# Patient Record
Sex: FEMALE | Race: BLACK OR AFRICAN AMERICAN | NOT HISPANIC OR LATINO | Employment: FULL TIME | URBAN - METROPOLITAN AREA
[De-identification: names, ages, dates, MRNs, and addresses within clinical notes are randomized per-mention and may not be internally consistent; named-entity substitution may affect disease eponyms.]

---

## 2017-01-03 ENCOUNTER — LAB VISIT (OUTPATIENT)
Dept: LAB | Facility: HOSPITAL | Age: 47
End: 2017-01-03
Attending: REGISTERED NURSE
Payer: COMMERCIAL

## 2017-01-03 ENCOUNTER — OFFICE VISIT (OUTPATIENT)
Dept: FAMILY MEDICINE | Facility: CLINIC | Age: 47
End: 2017-01-03
Payer: COMMERCIAL

## 2017-01-03 VITALS
DIASTOLIC BLOOD PRESSURE: 90 MMHG | TEMPERATURE: 98 F | BODY MASS INDEX: 33.06 KG/M2 | RESPIRATION RATE: 18 BRPM | HEART RATE: 91 BPM | WEIGHT: 198.44 LBS | HEIGHT: 65 IN | OXYGEN SATURATION: 96 % | SYSTOLIC BLOOD PRESSURE: 152 MMHG

## 2017-01-03 DIAGNOSIS — Z12.39 BREAST CANCER SCREENING: ICD-10-CM

## 2017-01-03 DIAGNOSIS — Z00.00 ANNUAL PHYSICAL EXAM: Primary | ICD-10-CM

## 2017-01-03 DIAGNOSIS — I10 ESSENTIAL HYPERTENSION: ICD-10-CM

## 2017-01-03 DIAGNOSIS — E66.9 OBESITY, CLASS I, BMI 30-34.9: ICD-10-CM

## 2017-01-03 DIAGNOSIS — Z00.00 ANNUAL PHYSICAL EXAM: ICD-10-CM

## 2017-01-03 LAB
ALBUMIN SERPL BCP-MCNC: 3.7 G/DL
ALP SERPL-CCNC: 66 U/L
ALT SERPL W/O P-5'-P-CCNC: 20 U/L
ANION GAP SERPL CALC-SCNC: 13 MMOL/L
AST SERPL-CCNC: 22 U/L
BASOPHILS # BLD AUTO: 0.01 K/UL
BASOPHILS NFR BLD: 0.1 %
BILIRUB SERPL-MCNC: 0.5 MG/DL
BUN SERPL-MCNC: 16 MG/DL
CALCIUM SERPL-MCNC: 8.9 MG/DL
CHLORIDE SERPL-SCNC: 106 MMOL/L
CHOLEST/HDLC SERPL: 3.2 {RATIO}
CO2 SERPL-SCNC: 19 MMOL/L
CREAT SERPL-MCNC: 1.2 MG/DL
CREAT UR-MCNC: 93 MG/DL
DIFFERENTIAL METHOD: NORMAL
EOSINOPHIL # BLD AUTO: 0.1 K/UL
EOSINOPHIL NFR BLD: 1.3 %
ERYTHROCYTE [DISTWIDTH] IN BLOOD BY AUTOMATED COUNT: 13.6 %
EST. GFR  (AFRICAN AMERICAN): >60 ML/MIN/1.73 M^2
EST. GFR  (NON AFRICAN AMERICAN): 54.3 ML/MIN/1.73 M^2
GLUCOSE SERPL-MCNC: 132 MG/DL
HCT VFR BLD AUTO: 38.9 %
HDL/CHOLESTEROL RATIO: 31.3 %
HDLC SERPL-MCNC: 166 MG/DL
HDLC SERPL-MCNC: 52 MG/DL
HGB BLD-MCNC: 13.3 G/DL
LDLC SERPL CALC-MCNC: 97.4 MG/DL
LYMPHOCYTES # BLD AUTO: 1.6 K/UL
LYMPHOCYTES NFR BLD: 21.6 %
MCH RBC QN AUTO: 30.2 PG
MCHC RBC AUTO-ENTMCNC: 34.2 %
MCV RBC AUTO: 88 FL
MICROALBUMIN UR DL<=1MG/L-MCNC: 28 UG/ML
MICROALBUMIN/CREATININE RATIO: 30.1 UG/MG
MONOCYTES # BLD AUTO: 0.4 K/UL
MONOCYTES NFR BLD: 5.5 %
NEUTROPHILS # BLD AUTO: 5.4 K/UL
NEUTROPHILS NFR BLD: 71.4 %
NONHDLC SERPL-MCNC: 114 MG/DL
PLATELET # BLD AUTO: 240 K/UL
PMV BLD AUTO: 11.9 FL
POTASSIUM SERPL-SCNC: 4 MMOL/L
PROT SERPL-MCNC: 7.2 G/DL
RBC # BLD AUTO: 4.41 M/UL
SODIUM SERPL-SCNC: 138 MMOL/L
TRIGL SERPL-MCNC: 83 MG/DL
TSH SERPL DL<=0.005 MIU/L-ACNC: 1.08 UIU/ML
WBC # BLD AUTO: 7.59 K/UL

## 2017-01-03 PROCEDURE — 85025 COMPLETE CBC W/AUTO DIFF WBC: CPT

## 2017-01-03 PROCEDURE — 3077F SYST BP >= 140 MM HG: CPT | Mod: S$GLB,,, | Performed by: REGISTERED NURSE

## 2017-01-03 PROCEDURE — 99396 PREV VISIT EST AGE 40-64: CPT | Mod: S$GLB,,, | Performed by: REGISTERED NURSE

## 2017-01-03 PROCEDURE — 99999 PR PBB SHADOW E&M-EST. PATIENT-LVL III: CPT | Mod: PBBFAC,,, | Performed by: REGISTERED NURSE

## 2017-01-03 PROCEDURE — 36415 COLL VENOUS BLD VENIPUNCTURE: CPT | Mod: PO

## 2017-01-03 PROCEDURE — 80061 LIPID PANEL: CPT

## 2017-01-03 PROCEDURE — 84443 ASSAY THYROID STIM HORMONE: CPT

## 2017-01-03 PROCEDURE — 3080F DIAST BP >= 90 MM HG: CPT | Mod: S$GLB,,, | Performed by: REGISTERED NURSE

## 2017-01-03 PROCEDURE — 80053 COMPREHEN METABOLIC PANEL: CPT

## 2017-01-03 PROCEDURE — 82570 ASSAY OF URINE CREATININE: CPT

## 2017-01-03 RX ORDER — VALSARTAN 160 MG/1
160 TABLET ORAL DAILY
Qty: 90 TABLET | Refills: 3 | Status: SHIPPED | OUTPATIENT
Start: 2017-01-03 | End: 2017-03-03

## 2017-01-03 NOTE — PROGRESS NOTES
"Subjective:       Patient ID: Mildred Meier is a 46 y.o. female.    Chief Complaint: Annual Exam    HPI     Mildred is here today for her annual wellness exam.  I have reviewed the patient's medical history in detail and updated the computerized patient record.    She has not been taking her HTN medication (benazepril-HCT) x few months, reports it gave her headaches and was not tolerating.  However, she continues with HA due to blood pressure being elevated off medication.  She wishes to try Diovan as she heard this was a "good medication for BP".  Home BP running ~ 140-150/80-90.  Treating HA with apple cider vinegar, BC Powder and Aleve.      Current Outpatient Prescriptions on File Prior to Visit   Medication Sig Dispense Refill    multivitamin (ONE DAILY MULTIVITAMIN) per tablet Take 1 tablet by mouth once daily.      benazepril-hydrochlorthiazide (LOTENSIN HCT) 10-12.5 mg Tab    NOT TAKING Take 1 tablet by mouth once daily. 90 tablet 1         Review of Systems   Constitutional: Positive for unexpected weight change (> 11 lbs in 1 year). Negative for activity change, appetite change, chills, diaphoresis, fatigue and fever.   HENT: Negative for congestion, nosebleeds, postnasal drip, rhinorrhea, sinus pressure, sneezing, sore throat and tinnitus.    Eyes: Negative.    Respiratory: Negative.    Cardiovascular: Negative for chest pain, palpitations and leg swelling.   Gastrointestinal: Negative.    Endocrine: Negative for cold intolerance, heat intolerance, polydipsia, polyphagia and polyuria.   Genitourinary: Negative.    Musculoskeletal: Negative.    Skin: Negative.    Neurological: Positive for headaches. Negative for dizziness, tremors, syncope, facial asymmetry, speech difficulty, weakness, light-headedness and numbness.   Hematological: Negative for adenopathy. Does not bruise/bleed easily.   Psychiatric/Behavioral: Negative.        Objective:         Vitals:    01/03/17 0804   BP: (!) 152/90   BP " "Location: Left arm   Patient Position: Sitting   BP Method: Manual   Pulse: 91   Resp: 18   Temp: 97.8 °F (36.6 °C)   TempSrc: Tympanic   SpO2: 96%   Weight: 90 kg (198 lb 6.6 oz)   Height: 5' 5" (1.651 m)       Physical Exam   Constitutional: She is oriented to person, place, and time. She appears well-developed and well-nourished.   HENT:   Head: Normocephalic and atraumatic.   Right Ear: External ear normal.   Left Ear: External ear normal.   Nose: Nose normal.   Mouth/Throat: Oropharynx is clear and moist. No oropharyngeal exudate.   Eyes: Conjunctivae and EOM are normal. Pupils are equal, round, and reactive to light. Right eye exhibits no discharge. Left eye exhibits no discharge. No scleral icterus.   Neck: Normal range of motion. Neck supple. No JVD present. No tracheal deviation present. No thyromegaly present.   Cardiovascular: Normal rate, regular rhythm, normal heart sounds and intact distal pulses.  Exam reveals no gallop and no friction rub.    No murmur heard.  Pulmonary/Chest: Effort normal and breath sounds normal. No stridor. No respiratory distress. She has no wheezes. She exhibits no tenderness. Right breast exhibits no mass. Left breast exhibits no mass. Breasts are symmetrical. There is no breast swelling.   Abdominal: Soft. Bowel sounds are normal. She exhibits no distension and no mass. There is no tenderness.   Genitourinary: Rectum normal and vagina normal. Rectal exam shows no tenderness and guaiac negative stool. No breast tenderness or discharge. There is no rash, tenderness or lesion on the right labia. There is no rash, tenderness or lesion on the left labia. No erythema or tenderness in the vagina. No vaginal discharge found.   Genitourinary Comments: No masses palpated.   Musculoskeletal: Normal range of motion. She exhibits no edema or deformity.   Lymphadenopathy:     She has no cervical adenopathy.   Neurological: She is alert and oriented to person, place, and time. She has " normal reflexes. She displays normal reflexes. No cranial nerve deficit. She exhibits normal muscle tone. Coordination normal.   Skin: Skin is warm and dry. No rash noted. She is not diaphoretic. No erythema.   Psychiatric: She has a normal mood and affect. Her behavior is normal. Judgment and thought content normal.   Vitals reviewed.      Assessment:       1. Annual physical exam    2. Essential hypertension    3. Obesity, Class I, BMI 30-34.9    4. Breast cancer screening        Plan:       Mildred Portillo was seen today for annual exam.    Diagnoses and all orders for this visit:    Annual physical exam  -     Mammo Digital Screening Bilat with CAD; Future  -     CBC auto differential; Future  -     Comprehensive metabolic panel; Future  -     TSH; Future  -     Lipid panel; Future  -     Microalbumin/creatinine urine ratio    Essential hypertension  -     valsartan (DIOVAN) 160 MG tablet; Take 1 tablet (160 mg total) by mouth once daily.  -     CBC auto differential; Future  -     Comprehensive metabolic panel; Future  -     TSH; Future  -     Lipid panel; Future  -     Microalbumin/creatinine urine ratio    Obesity, Class I, BMI 30-34.9  -     CBC auto differential; Future  -     Comprehensive metabolic panel; Future  -     TSH; Future  -     Lipid panel; Future    Breast cancer screening  -     Mammo Digital Screening Bilat with CAD; Future      Lab results pending.  Declines flu shot today.  Follow-up in 1 week for nurse visit BP recheck.  RTC prn.

## 2017-01-03 NOTE — MR AVS SNAPSHOT
[facility]  [address] [address]  Phone: [phone]                  Mildred Meier   1/3/2017 8:00 AM   Office Visit    Description:  Female : 1970   Provider:  Bassam Coffey NP   Department:  [facility]           Reason for Visit     Annual Exam           Diagnoses this Visit        Comments    Annual physical exam    -  Primary     Essential hypertension         Obesity, Class I, BMI 30-34.9         Breast cancer screening                To Do List           Future Appointments        Provider Department Dept Phone    1/3/2017 11:10 AM LABORATORY, [address] [facility]  [phone]    2017 4:00 PM [facility] [id number] [facility] [phone]    1/10/2017 9:00 AM FAMILY MEDICINE NURSE, [place] [facility] [phone]      Goals (5 Years of Data)     None       These Medications        Disp Refills Start End    valsartan (DIOVAN) 160 MG tablet 90 tablet 3 1/3/2017 1/3/2018    Take 1 tablet (160 mg total) by mouth once daily. - Oral    Pharmacy: [facility] [place] [address] [address] AT [facility] Ph #: [phone]         [facility]     [facility] Care [facility] -  Assistance  Registered nurses in the [facility] provide clinical advisement, health education, appointment booking, and other advisory services.  Call for this free service at [phone].             Medications           Message regarding Medications     Verify the changes and/or additions to your medication regime listed below are the same as discussed with your clinician today.  If any of these changes or additions are incorrect, please notify your healthcare provider.        START taking these NEW medications        Refills    valsartan (DIOVAN) 160 MG tablet 3    Sig: Take 1 tablet (160 mg total) by mouth once daily.    Class:  "Normal    Route: Oral      STOP taking these medications     benazepril-hydrochlorthiazide (LOTENSIN HCT) 10-12.5 mg Tab Take 1 tablet by mouth once daily.           Verify that the below list of medications is an accurate representation of the medications you are currently taking.  If none reported, the list may be blank. If incorrect, please contact your healthcare provider. Carry this list with you in case of emergency.           Current Medications     multivitamin (ONE DAILY MULTIVITAMIN) per tablet Take 1 tablet by mouth once daily.    blood pressure monitor Kit 1 kit by Misc.(Non-Drug; Combo Route) route as directed.    valsartan (DIOVAN) 160 MG tablet Take 1 tablet (160 mg total) by mouth once daily.           Clinical Reference Information           Vital Signs - Last Recorded  Most recent update: 1/3/2017  8:06 AM by Nasreen Currie MA    BP Pulse Temp Resp Ht Wt    (!) 152/90 (BP Location: Left arm, Patient Position: Sitting, BP Method: Manual) 91 97.8 °F (36.6 °C) (Tympanic) 18 5' 5" (1.651 m) 90 kg (198 lb 6.6 oz)    SpO2 BMI             96% 33.02 kg/m2         Blood Pressure          Most Recent Value    BP  (!)  152/90      Allergies as of 1/3/2017     No Known Allergies      Immunizations Administered on Date of Encounter - 1/3/2017     None      Orders Placed During Today's Visit      Normal Orders This Visit    Microalbumin/creatinine urine ratio     Future Labs/Procedures Expected by Expires    CBC auto differential  1/3/2017 3/4/2018    Comprehensive metabolic panel  1/3/2017 3/4/2018    Lipid panel  1/3/2017 3/4/2018    Mammo Digital Screening Bilat with CAD  1/3/2017 3/5/2018    TSH  1/3/2017 3/4/2018      [facility] Sign-Up     Activating your [facility] account is as easy as 1-2-3!     1) Visit [email or web address], select Sign Up Now, enter this activation code and your date of birth, then select Next.  [email or web address]  Expires: 2/17/2017  8:21 AM      2) Create a username and password to use when " you visit [facility] in the future and select a security question in case you lose your password and select Next.    3) Enter your e-mail address and click Sign Up!    Additional Information  If you have questions, please e-mail [email or web address] or call [phone] to talk to our [facility] staff. Remember, [facility] is NOT to be used for urgent needs. For medical emergencies, dial 911.

## 2017-01-04 ENCOUNTER — TELEPHONE (OUTPATIENT)
Dept: FAMILY MEDICINE | Facility: CLINIC | Age: 47
End: 2017-01-04

## 2017-01-04 DIAGNOSIS — R73.01 ELEVATED FASTING GLUCOSE: Primary | ICD-10-CM

## 2017-01-04 NOTE — TELEPHONE ENCOUNTER
Labs reviewed ---- fasting sugar elevated, need to screen for diabetes.  All other labs normal.    A1c ordered.

## 2017-01-10 ENCOUNTER — HOSPITAL ENCOUNTER (OUTPATIENT)
Dept: RADIOLOGY | Facility: HOSPITAL | Age: 47
Discharge: HOME | End: 2017-01-10
Attending: REGISTERED NURSE
Payer: COMMERCIAL

## 2017-01-10 DIAGNOSIS — Z12.39 BREAST CANCER SCREENING: ICD-10-CM

## 2017-01-10 DIAGNOSIS — Z00.00 ANNUAL PHYSICAL EXAM: ICD-10-CM

## 2017-01-10 PROCEDURE — 77067 SCR MAMMO BI INCL CAD: CPT | Mod: TC

## 2017-01-10 PROCEDURE — 77067 SCR MAMMO BI INCL CAD: CPT | Mod: 26,,, | Performed by: RADIOLOGY

## 2017-02-20 ENCOUNTER — TELEPHONE (OUTPATIENT)
Dept: FAMILY MEDICINE | Facility: CLINIC | Age: 47
End: 2017-02-20

## 2017-02-20 NOTE — TELEPHONE ENCOUNTER
The glucose issue also needs to be addressed.  Have been trying to get in touch w/ her since January about this.

## 2017-02-20 NOTE — TELEPHONE ENCOUNTER
She doesn't take her BP. I advised her to take her BP before she takes her medication when i talked to her the first time.

## 2017-02-20 NOTE — TELEPHONE ENCOUNTER
"Pt states that BP medication makes her lightheaded "drunk" and dizzy. Did not take it today and she says she feels fine.   "

## 2017-02-20 NOTE — TELEPHONE ENCOUNTER
So how has her BP been running?    Also, please seen previous msg in which she has not been able to get in touch with.  Her FBS was elevated --- A1C was ordered to rule out diabetes.

## 2017-02-20 NOTE — TELEPHONE ENCOUNTER
----- Message from Gudelia Perez sent at 2/20/2017  2:38 PM CST -----  Pt at [phone]//states is calling regarding a medication that was prescribed//med is bp med//(does not have name with her)she has a question about the med//about how it makes her feel//please call to discuss//qiana/jose rafael

## 2017-02-21 NOTE — TELEPHONE ENCOUNTER
Appointment made for A1c and Pt will take BP before taking medication and when feeling dizzy and report on Friday.

## 2017-02-24 ENCOUNTER — LAB VISIT (OUTPATIENT)
Dept: LAB | Facility: HOSPITAL | Age: 47
End: 2017-02-24
Attending: REGISTERED NURSE
Payer: COMMERCIAL

## 2017-02-24 DIAGNOSIS — R73.01 ELEVATED FASTING GLUCOSE: ICD-10-CM

## 2017-02-24 PROCEDURE — 36415 COLL VENOUS BLD VENIPUNCTURE: CPT | Mod: PO

## 2017-02-24 PROCEDURE — 83036 HEMOGLOBIN GLYCOSYLATED A1C: CPT

## 2017-02-25 LAB
ESTIMATED AVG GLUCOSE: 192 MG/DL
HBA1C MFR BLD HPLC: 8.3 %

## 2017-02-27 ENCOUNTER — TELEPHONE (OUTPATIENT)
Dept: FAMILY MEDICINE | Facility: CLINIC | Age: 47
End: 2017-02-27

## 2017-02-27 NOTE — TELEPHONE ENCOUNTER
A1c 8.3 ----- diabetes    Needs to see me to discuss treatment, meds, diet, etc.  Book extended slot to give extra time to go over everything.

## 2017-03-03 ENCOUNTER — OFFICE VISIT (OUTPATIENT)
Dept: FAMILY MEDICINE | Facility: CLINIC | Age: 47
End: 2017-03-03
Payer: COMMERCIAL

## 2017-03-03 VITALS
BODY MASS INDEX: 32.32 KG/M2 | RESPIRATION RATE: 18 BRPM | WEIGHT: 194 LBS | HEART RATE: 101 BPM | DIASTOLIC BLOOD PRESSURE: 84 MMHG | HEIGHT: 65 IN | SYSTOLIC BLOOD PRESSURE: 136 MMHG | OXYGEN SATURATION: 98 % | TEMPERATURE: 98 F

## 2017-03-03 DIAGNOSIS — I10 ESSENTIAL HYPERTENSION: ICD-10-CM

## 2017-03-03 PROCEDURE — 4010F ACE/ARB THERAPY RXD/TAKEN: CPT | Mod: S$GLB,,, | Performed by: REGISTERED NURSE

## 2017-03-03 PROCEDURE — 99214 OFFICE O/P EST MOD 30 MIN: CPT | Mod: S$GLB,,, | Performed by: REGISTERED NURSE

## 2017-03-03 PROCEDURE — 3079F DIAST BP 80-89 MM HG: CPT | Mod: S$GLB,,, | Performed by: REGISTERED NURSE

## 2017-03-03 PROCEDURE — 99999 PR PBB SHADOW E&M-EST. PATIENT-LVL III: CPT | Mod: PBBFAC,,, | Performed by: REGISTERED NURSE

## 2017-03-03 PROCEDURE — 2022F DILAT RTA XM EVC RTNOPTHY: CPT | Mod: S$GLB,,, | Performed by: REGISTERED NURSE

## 2017-03-03 PROCEDURE — 3045F PR MOST RECENT HEMOGLOBIN A1C LEVEL 7.0-9.0%: CPT | Mod: S$GLB,,, | Performed by: REGISTERED NURSE

## 2017-03-03 PROCEDURE — 3075F SYST BP GE 130 - 139MM HG: CPT | Mod: S$GLB,,, | Performed by: REGISTERED NURSE

## 2017-03-03 PROCEDURE — 1160F RVW MEDS BY RX/DR IN RCRD: CPT | Mod: S$GLB,,, | Performed by: REGISTERED NURSE

## 2017-03-03 RX ORDER — LANCETS
1 EACH MISCELLANEOUS 2 TIMES DAILY
Qty: 200 EACH | Refills: 6 | Status: SHIPPED | OUTPATIENT
Start: 2017-03-03 | End: 2017-07-11 | Stop reason: SDUPTHER

## 2017-03-03 RX ORDER — LISINOPRIL 20 MG/1
20 TABLET ORAL DAILY
Qty: 90 TABLET | Refills: 0 | Status: SHIPPED | OUTPATIENT
Start: 2017-03-03 | End: 2022-10-25

## 2017-03-03 RX ORDER — GLIMEPIRIDE 2 MG/1
2 TABLET ORAL
Qty: 90 TABLET | Refills: 1 | Status: SHIPPED | OUTPATIENT
Start: 2017-03-03 | End: 2019-02-14

## 2017-03-03 RX ORDER — METFORMIN HYDROCHLORIDE 500 MG/1
500 TABLET, EXTENDED RELEASE ORAL
Qty: 90 TABLET | Refills: 1 | Status: SHIPPED | OUTPATIENT
Start: 2017-03-03 | End: 2022-10-25

## 2017-03-03 NOTE — MR AVS SNAPSHOT
[facility]  [address]  [place] [address]  Phone: [phone]                  Mildred Meier   3/3/2017 9:30 AM   Office Visit    Description:  Female : 1970   Provider:  Bassam Coffey NP   Department:  [facility]           Reason for Visit     Follow-up           Diagnoses this Visit        Comments    Uncontrolled type 2 diabetes mellitus without complication, without long-term current use of insulin    -  Primary     Essential hypertension                To Do List           Goals (5 Years of Data)     None       These Medications        Disp Refills Start End    lisinopril (PRINIVIL,ZESTRIL) 20 MG tablet 90 tablet 0 3/3/2017 3/3/2018    Take 1 tablet (20 mg total) by mouth once daily. - Oral    Pharmacy: [facility] [address] [place] - [address] AT [facility] Ph #: [phone]       metformin (GLUCOPHAGE-XR) 500 MG 24 hr tablet 90 tablet 1 3/3/2017 3/3/2018    Take 1 tablet (500 mg total) by mouth daily with dinner or evening meal. - Oral    Pharmacy: [facility] [address] [place] - [address] [facility] Ph #: [phone]       glimepiride (AMARYL) 2 MG tablet 90 tablet 1 3/3/2017 3/3/2018    Take 1 tablet (2 mg total) by mouth before breakfast. - Oral    Pharmacy: [facility] [address] [place] - [address] AT [facility] Ph #: [phone]       blood sugar diagnostic Strp 200 strip 6 3/3/2017     1 strip by Misc.(Non-Drug; Combo Route) route 2 (two) times daily. - Misc.(Non-Drug; Combo Route)    Pharmacy: [facility] [address] [place] [address] [place] - [address] AT [facility] Ph #: [phone]       lancets (LANCETS,ULTRA THIN) Misc 200 each 6 3/3/2017     1 lancet by Misc.(Non-Drug; Combo Route) route 2 (two) times daily. - Misc.(Non-Drug; Combo Route)    Pharmacy: [facility] [address] [facility] [address] [place] [address] [address] AT  Alexandra Acosta  #: [phone]         [facility]     [facility] Nurse [facility] -  Assistance  Registered nurses in the [facility] provide clinical advisement, health education, appointment booking, and other advisory services.  Call for this free service at [phone].             Medications           Message regarding Medications     Verify the changes and/or additions to your medication regime listed below are the same as discussed with your clinician today.  If any of these changes or additions are incorrect, please notify your healthcare provider.        START taking these NEW medications        Refills    lisinopril (PRINIVIL,ZESTRIL) 20 MG tablet 0    Sig: Take 1 tablet (20 mg total) by mouth once daily.    Class: Normal    Route: Oral    metformin (GLUCOPHAGE-XR) 500 MG 24 hr tablet 1    Sig: Take 1 tablet (500 mg total) by mouth daily with dinner or evening meal.    Class: Normal    Route: Oral    glimepiride (AMARYL) 2 MG tablet 1    Sig: Take 1 tablet (2 mg total) by mouth before breakfast.    Class: Normal    Route: Oral    blood sugar diagnostic Strp 6    Si strip by Misc.(Non-Drug; Combo Route) route 2 (two) times daily.    Class: Normal    Route: Misc.(Non-Drug; Combo Route)    lancets (LANCETS,ULTRA THIN) Misc 6    Si lancet by Misc.(Non-Drug; Combo Route) route 2 (two) times daily.    Class: Normal    Route: Misc.(Non-Drug; Combo Route)      STOP taking these medications     valsartan (DIOVAN) 160 MG tablet Take 1 tablet (160 mg total) by mouth once daily.           Verify that the below list of medications is an accurate representation of the medications you are currently taking.  If none reported, the list may be blank. If incorrect, please contact your healthcare provider. Carry this list with you in case of emergency.           Current Medications     blood pressure monitor Kit 1 kit by Misc.(Non-Drug; Combo Route) route as directed.    multivitamin  "(ONE DAILY MULTIVITAMIN) per tablet Take 1 tablet by mouth once daily.    blood sugar diagnostic Strp 1 strip by Misc.(Non-Drug; Combo Route) route 2 (two) times daily.    glimepiride (AMARYL) 2 MG tablet Take 1 tablet (2 mg total) by mouth before breakfast.    lancets (LANCETS,ULTRA THIN) Misc 1 lancet by Misc.(Non-Drug; Combo Route) route 2 (two) times daily.    lisinopril (PRINIVIL,ZESTRIL) 20 MG tablet Take 1 tablet (20 mg total) by mouth once daily.    metformin (GLUCOPHAGE-XR) 500 MG 24 hr tablet Take 1 tablet (500 mg total) by mouth daily with dinner or evening meal.           Clinical Reference Information           Your Vitals Were     BP Pulse Temp Resp Height Weight    136/84 (BP Location: Left arm, Patient Position: Sitting, BP Method: Manual) 101 98.4 °F (36.9 °C) (Tympanic) 18 5' 5" (1.651 m) 88 kg (194 lb 0.1 oz)    SpO2 BMI             98% 32.28 kg/m2         Blood Pressure          Most Recent Value    BP  136/84      Allergies as of 3/3/2017     No Known Allergies      Immunizations Administered on Date of Encounter - 3/3/2017     None      [facility] Sign-Up     Activating your [facility] account is as easy as 1-2-3!     1) Visit [email or web address], select Sign Up Now, enter this activation code and your date of birth, then select Next.  [email or web address]  Expires: 4/17/2017 10:13 AM      2) Create a username and password to use when you visit [facility] in the future and select a security question in case you lose your password and select Next.    3) Enter your e-mail address and click Sign Up!    Additional Information  If you have questions, please e-mail [email or web address] or call [phone] to talk to our [facility] staff. Remember, [facility] is NOT to be used for urgent needs. For medical emergencies, dial 911.         Language Assistance Services     ATTENTION: Language assistance services are available, free of charge. Please call [phone].      ATENCIÓN: Si kev cesar " disposición servicios gratuitos de asistencia lingüística. Anjalikami al [phone].     MARBIN Ý: N?u b?n nói Ti?ng Vi?t, có các d?ch v? h? tr? ngôn ng? mi?n phí dành cho b?n. G?i s? [phone].         [facility] complies with applicable Federal civil rights laws and does not discriminate on the basis of race, color, national origin, age, disability, or sex.

## 2017-03-03 NOTE — PROGRESS NOTES
"Subjective:       Patient ID: Mildred Meier is a 46 y.o. female.    Chief Complaint: Diabetes    HPI     Mildred is here today to discuss new-onset DM Type-2.    Diet --- denies fast/junk food, reports fruits/veg  Exercise --- 4 days per week  Weight --- stable    Denies increased hunger, thirst or urination.  Denies neuropathy or vision problems.    Reports not feeling well after taking Diovan --- home BP ~ 130 to 150 over 80 to 90.    Accucheck today in office on sample glucometer --- 242        Lab Results   Component Value Date    HGBA1C 8.3 (H) 02/24/2017       Review of Systems   Constitutional: Negative.    Eyes: Negative.    Respiratory: Negative.    Cardiovascular: Negative.    Endocrine: Negative for cold intolerance, heat intolerance, polydipsia, polyphagia and polyuria.   Skin: Negative.    Neurological: Negative.        Objective:         Vitals:    03/03/17 0943   BP: 136/84   BP Location: Left arm   Patient Position: Sitting   BP Method: Manual   Pulse: 101   Resp: 18   Temp: 98.4 °F (36.9 °C)   TempSrc: Tympanic   SpO2: 98%   Weight: 88 kg (194 lb 0.1 oz)   Height: 5' 5" (1.651 m)       Physical Exam   Constitutional: She is oriented to person, place, and time. She appears well-developed and well-nourished. No distress.   Neurological: She is alert and oriented to person, place, and time.   Skin: She is not diaphoretic.   Vitals reviewed.        Protective Sensation (w/ 10 gram monofilament):  Right: Intact  Left: Intact    Visual Inspection:  Normal -  Bilateral    Pedal Pulses:   Right: Present  Left: Present    Posterior tibialis:   Right:Present  Left: Present      Assessment:       1. Uncontrolled type 2 diabetes mellitus without complication, without long-term current use of insulin    2. Essential hypertension        Plan:       Mildred Portillo was seen today for diabetes.    Diagnoses and all orders for this visit:    Uncontrolled type 2 diabetes mellitus without complication, without " long-term current use of insulin  -     lisinopril (PRINIVIL,ZESTRIL) 20 MG tablet; Take 1 tablet (20 mg total) by mouth once daily.  -     metformin (GLUCOPHAGE-XR) 500 MG 24 hr tablet; Take 1 tablet (500 mg total) by mouth daily with dinner or evening meal.  -     glimepiride (AMARYL) 2 MG tablet; Take 1 tablet (2 mg total) by mouth before breakfast.  -     blood sugar diagnostic Strp; 1 strip by Misc.(Non-Drug; Combo Route) route 2 (two) times daily.  -     lancets (LANCETS,ULTRA THIN) Misc; 1 lancet by Misc.(Non-Drug; Combo Route) route 2 (two) times daily.    Essential hypertension  -     lisinopril (PRINIVIL,ZESTRIL) 20 MG tablet; Take 1 tablet (20 mg total) by mouth once daily.      Glucometer provided --- instructed on use.  Diabetic information and handouts provided.  Stop Diovan, started on lisinopril.  Routine FBS and 2 hr PP --- keep log.  Medication discussed.  Eye exam.  She has been instructed to contact me back in 1 week with glucose and BP readings.  Follow-up in 3 months for A1c recheck.

## 2017-04-03 ENCOUNTER — TELEPHONE (OUTPATIENT)
Dept: FAMILY MEDICINE | Facility: CLINIC | Age: 47
End: 2017-04-03

## 2017-04-03 NOTE — LETTER
April 17, 2017    Mildred Meier  [address] [address]             [facility]  [address] [address]  Phone: [phone] Dear Mrs. Meier:    The office has been unsuccessful in trying to contact you. Can you please contact the office at your earliest convenience.    Sincerely,        Bassam Sykes LPN

## 2017-04-03 NOTE — TELEPHONE ENCOUNTER
Seen 1 month ago for new onset DM --- started on medication.  She was to contact me back with FBS readings but have not heard back from her.  Please contact her to f/u to see how she is doing.  How are sugars running?

## 2017-04-06 NOTE — TELEPHONE ENCOUNTER
She states she has been doing. She says the metformin has her itching and shes breaking out bad in her face.

## 2017-04-11 NOTE — TELEPHONE ENCOUNTER
She had Derm appt last week --- was rash caused by metformin?    Needs to be on something for her diabetes.  If not allergic to metformin, then needs to restart.  If not able to take, then I need to call in something else.

## 2017-07-11 RX ORDER — LANCETS
1 EACH MISCELLANEOUS 2 TIMES DAILY
Qty: 200 EACH | Refills: 6 | Status: CANCELLED | OUTPATIENT
Start: 2017-07-11

## 2017-07-11 RX ORDER — LANCETS
1 EACH MISCELLANEOUS 2 TIMES DAILY
Qty: 200 EACH | Refills: 0 | Status: SHIPPED | OUTPATIENT
Start: 2017-07-11 | End: 2022-10-25

## 2017-07-11 RX ORDER — DEXTROSE 4 G
TABLET,CHEWABLE ORAL
Qty: 1 EACH | Refills: 0 | OUTPATIENT
Start: 2017-07-11 | End: 2018-07-11

## 2017-07-11 NOTE — TELEPHONE ENCOUNTER
----- Message from Kalpana Chapin sent at 7/11/2017  3:22 PM CDT -----  Contact: [facility] [phone]  States that pt needs new rx for one touch meter, strips and lancets. Please call back at [phone]//thank you acc

## 2017-07-19 ENCOUNTER — TELEPHONE (OUTPATIENT)
Dept: FAMILY MEDICINE | Facility: CLINIC | Age: 47
End: 2017-07-19

## 2017-07-19 NOTE — LETTER
July 24, 2017    Mildred Meier  [address] [address]             [facility]  [address] [address]  Phone: [phone] Dear Mrs. Meier:    We have been trying to reach you. It is time for you to come in for your Hemoglobin A1C check. Please give us a call at the office at your earliest convienence.     Sincerely,    Basasm Coffey NP

## 2017-07-19 NOTE — TELEPHONE ENCOUNTER
Overdue for A1c.  Have tried to get in touch w/ her to obtain FBS but have not heard from her.  Letter was mailed.  Not sure if she is even taking her meds.  Lab orders in --- needs to have A1c done.

## 2018-03-09 ENCOUNTER — PATIENT OUTREACH (OUTPATIENT)
Dept: ADMINISTRATIVE | Facility: HOSPITAL | Age: 48
End: 2018-03-09

## 2019-01-31 ENCOUNTER — PATIENT OUTREACH (OUTPATIENT)
Dept: ADMINISTRATIVE | Facility: HOSPITAL | Age: 49
End: 2019-01-31

## 2019-02-14 ENCOUNTER — OFFICE VISIT (OUTPATIENT)
Dept: FAMILY MEDICINE | Facility: CLINIC | Age: 49
End: 2019-02-14
Payer: COMMERCIAL

## 2019-02-14 VITALS
SYSTOLIC BLOOD PRESSURE: 138 MMHG | DIASTOLIC BLOOD PRESSURE: 84 MMHG | BODY MASS INDEX: 34.4 KG/M2 | WEIGHT: 201.5 LBS | HEART RATE: 84 BPM | HEIGHT: 64 IN | TEMPERATURE: 98 F | OXYGEN SATURATION: 98 %

## 2019-02-14 DIAGNOSIS — Z53.21 PATIENT LEFT AFTER TRIAGE: Primary | ICD-10-CM

## 2019-02-14 PROCEDURE — 99999 PR PBB SHADOW E&M-EST. PATIENT-LVL III: CPT | Mod: PBBFAC,,, | Performed by: REGISTERED NURSE

## 2019-02-14 PROCEDURE — 99499 UNLISTED E&M SERVICE: CPT | Mod: S$GLB,,, | Performed by: REGISTERED NURSE

## 2019-02-14 PROCEDURE — 99499 NO LOS: ICD-10-PCS | Mod: S$GLB,,, | Performed by: REGISTERED NURSE

## 2019-02-14 PROCEDURE — 99999 PR PBB SHADOW E&M-EST. PATIENT-LVL III: ICD-10-PCS | Mod: PBBFAC,,, | Performed by: REGISTERED NURSE

## 2019-02-14 NOTE — PROGRESS NOTES
PT here today for her annual wellness exam.  She has been followed by Dr. Mitchell at [facility] (Family [facility]) and actually has her annual wellness exam scheduled w/ her next week.  Advised to keep appt with PCP next week, todays appt cancelled.

## 2022-02-01 ENCOUNTER — HOSPITAL ENCOUNTER (OUTPATIENT)
Dept: RADIOLOGY | Facility: HOSPITAL | Age: 52
Discharge: HOME | End: 2022-02-01
Attending: PODIATRIST
Payer: COMMERCIAL

## 2022-02-01 ENCOUNTER — OFFICE VISIT (OUTPATIENT)
Dept: PODIATRY | Facility: CLINIC | Age: 52
End: 2022-02-01
Payer: COMMERCIAL

## 2022-02-01 VITALS — BODY MASS INDEX: 31.65 KG/M2 | WEIGHT: 190 LBS | HEIGHT: 65 IN

## 2022-02-01 DIAGNOSIS — M76.62 TENDONITIS, ACHILLES, LEFT: Primary | ICD-10-CM

## 2022-02-01 DIAGNOSIS — M76.62 TENDONITIS, ACHILLES, LEFT: ICD-10-CM

## 2022-02-01 PROCEDURE — 73630 XR FOOT COMPLETE 3 VIEW LEFT: ICD-10-PCS | Mod: 26,LT,, | Performed by: RADIOLOGY

## 2022-02-01 PROCEDURE — 1159F MED LIST DOCD IN RCRD: CPT | Mod: CPTII,S$GLB,, | Performed by: PODIATRIST

## 2022-02-01 PROCEDURE — 99999 PR PBB SHADOW E&M-NEW PATIENT-LVL III: CPT | Mod: PBBFAC,,, | Performed by: PODIATRIST

## 2022-02-01 PROCEDURE — 99204 PR OFFICE/OUTPT VISIT, NEW, LEVL IV, 45-59 MIN: ICD-10-PCS | Mod: S$GLB,,, | Performed by: PODIATRIST

## 2022-02-01 PROCEDURE — 73630 X-RAY EXAM OF FOOT: CPT | Mod: TC,LT

## 2022-02-01 PROCEDURE — 73630 X-RAY EXAM OF FOOT: CPT | Mod: 26,LT,, | Performed by: RADIOLOGY

## 2022-02-01 PROCEDURE — 99204 OFFICE O/P NEW MOD 45 MIN: CPT | Mod: S$GLB,,, | Performed by: PODIATRIST

## 2022-02-01 PROCEDURE — 1159F PR MEDICATION LIST DOCUMENTED IN MEDICAL RECORD: ICD-10-PCS | Mod: CPTII,S$GLB,, | Performed by: PODIATRIST

## 2022-02-01 PROCEDURE — 3008F PR BODY MASS INDEX (BMI) DOCUMENTED: ICD-10-PCS | Mod: CPTII,S$GLB,, | Performed by: PODIATRIST

## 2022-02-01 PROCEDURE — 99999 PR PBB SHADOW E&M-NEW PATIENT-LVL III: ICD-10-PCS | Mod: PBBFAC,,, | Performed by: PODIATRIST

## 2022-02-01 PROCEDURE — 3008F BODY MASS INDEX DOCD: CPT | Mod: CPTII,S$GLB,, | Performed by: PODIATRIST

## 2022-02-01 RX ORDER — SEMAGLUTIDE 1.34 MG/ML
INJECTION, SOLUTION SUBCUTANEOUS
COMMUNITY

## 2022-02-01 RX ORDER — OMEPRAZOLE 40 MG/1
40 CAPSULE, DELAYED RELEASE ORAL DAILY
COMMUNITY

## 2022-02-01 RX ORDER — METHYLPREDNISOLONE 4 MG/1
TABLET ORAL
Qty: 21 TABLET | Refills: 0 | Status: SHIPPED | OUTPATIENT
Start: 2022-02-01 | End: 2022-10-25

## 2022-02-01 RX ORDER — GLIMEPIRIDE 4 MG/1
4 TABLET ORAL 2 TIMES DAILY
COMMUNITY

## 2022-02-01 NOTE — PATIENT INSTRUCTIONS
Patient Education       Achilles Tendinopathy Discharge Instructions   About this topic   The Achilles tendon connects the calf muscle to the heel bone. It helps your foot push forward when you walk and lets you rise up on your toes. Achilles tendinopathy happens over a period of time. It includes swelling or tiny tears in the tissue around the tendon. Swelling happens with overuse, injury, infection, physical activity, wearing high heels, or bone growth. The swelling and irritation causes pain when walking or running.  What care is needed at home?   · Ask your doctor what you need to do when you go home. Make sure you ask questions if you do not understand what the doctor says. This way you will know what you need to do.  · Rest. Talk to your doctor about when you can return to your normal activities. Allow your injury to heal before you do slow movements.  · Place an ice pack or a bag of frozen peas wrapped in a towel over the painful part. Never put ice right on the skin. Do not leave the ice on more than 10 to 15 minutes at a time. Ice after activity may help decrease pain and swelling. Never ice before stretching.  · Prop your foot on pillows to help with swelling.  · A compression bandage can be wrapped lightly around the injured area for support and to ease swelling.  · Splint to keep your ankle in position  · Ask your doctor or physical therapist if shoe inserts could help you. These are foot orthotics.  · If you had surgery, your doctor will tell you when you can shower.  What follow-up care is needed?   · Your doctor may ask you to make visits to the office to check on your progress. Be sure to keep these visits.  · If you have stitches or staples after surgery, you will need to have them taken out. Your doctor will often want to do this in 1 to 2 weeks.  · Your doctor may suggest exercises to make your muscles stronger. Your doctor may send you to physical therapy for treatments and exercises to help you  heal faster.  What drugs may be needed?   The doctor may order drugs to:  · Help with pain and swelling  · Prevent or fight an infection  Will physical activity be limited?   You will need to rest your ankle for a while. You should not do physical activity that makes your health problem worse. If you run, work out, or play sports, you may not be able to do those things until your health problem gets better.  What problems could happen?   · Tendon rupture  · Infection if you had surgery  What can be done to prevent this health problem?   · Warm up slowly and stretch your muscles before you work out. Use good ways to train, such as slowly adding to how far you run. Do not work out if you are overly tired. Take extra care if working out in cold weather.  · Stay active and work out to keep your muscles strong and flexible.  · Take breaks often when doing things that use repeat movements.  · Avoid running on hard surfaces.  · Wear shoes with good support. Do not go barefoot.  · Avoid wearing high heels if you have recurrent Achilles tendinopathy.  · Keep a healthy weight so there is not extra stress on your joints. Eat a healthy diet to keep your muscles healthy.  When do I need to call the doctor?   · Signs of infection. These include a fever of 100.4°F (38°C) or higher, chills, wound that will not heal.  · Signs of wound infection. These include swelling, redness, warmth around the wound; too much pain when touched; yellowish, greenish, or bloody discharge; foul smell coming from the cut site; cut site opens up.  · Pain and swelling gets worse  Teach Back: Helping You Understand   The Teach Back Method helps you understand the information we are giving you. After you talk with the staff, tell them in your own words what you learned. This helps to make sure the staff has described each thing clearly. It also helps to explain things that may have been confusing. Before going home, make sure you can do these:  · I can  tell you about my condition.  · I can tell you what may help ease my pain.  · I can tell you what I will do if I have more pain or swelling.  Where can I learn more?   American [employer] Association  https://www.choosept.com/symptomsconditionsdetail/achilles-tendon-injuries-tendinopathy   Better [employer]  https://www.betterhealth.anika.gov.au/health/ConditionsAndTreatments/achilles-tendonitis   Last Reviewed Date   2020-02-11  Consumer Information Use and Disclaimer   This information is not specific medical advice and does not replace information you receive from your health care provider. This is only a brief summary of general information. It does NOT include all information about conditions, illnesses, injuries, tests, procedures, treatments, therapies, discharge instructions or life-style choices that may apply to you. You must talk with your health care provider for complete information about your health and treatment options. This information should not be used to decide whether or not to accept your health care providers advice, instructions or recommendations. Only your health care provider has the knowledge and training to provide advice that is right for you.  Copyright   Copyright © 2021 UpToDate, Inc. and its affiliates and/or licensors. All rights reserved.

## 2022-02-01 NOTE — PROGRESS NOTES
[facility] -   PODIATRIC MEDICINE AND SURGERY      CHIEF COMPLAINT   Chief Complaint   Patient presents with    Heel Pain     C/o left heel pain, plantar heel and achilles, throbbing pain, x 1 month, rates pain 10/10, diabetic, wears casual shoes and socks, getting established with PCP soon         HPI  Mildred ARCHIBALD is a 51 y.o. female w/ PMH of HTN and DM , who presents today complaining of painful achilles /  posterior aspect of the keft heel.  Pt states she has had this problem for months in the past, but pain resolved..   Pt describes it as a dull, achy, sometimes sharp pain that is worse when she first gets out of bed in the morning then seems to lessen as the day progresses and returns with more activity.  Pt denies any redness, bruising or acute injury.     Modifying Factors (Aggravating): weight bearing   Modifying Factors (Alleviating): tennis shoes;Tylenol     Pain is 10/10 on pain scale    Patient denies other pedal complaints at this time.    No results found for: HGBA1C      PMH  Past Medical History:   Diagnosis Date    Diabetes mellitus, type 2     Hypertension        PROBLEM LIST  There are no problems to display for this patient.      MEDS  Current Outpatient Medications on File Prior to Visit   Medication Sig Dispense Refill    glimepiride (AMARYL) 4 MG tablet Take 4 mg by mouth 2 (two) times daily.      losartan (COZAAR) 12.5 MG tablet Take 25 mg by mouth once daily.      omeprazole (PRILOSEC) 40 MG capsule Take 40 mg by mouth once daily.      semaglutide (OZEMPIC) 1 mg/dose (2 mg/1.5 mL) PnIj Inject into the skin every 7 days.       No current facility-administered medications on file prior to visit.       PSH   History reviewed. No pertinent surgical history.     ALL  Review of patient's allergies indicates:   Allergen Reactions    Metformin Itching and Rash       SOC     Social History     Tobacco Use    Smoking status: Never Smoker    Smokeless tobacco: Never Used  "        Family HX    Family History   Problem Relation Age of Onset    Cancer Mother             REVIEW OF SYSTEMS  General: Denies any fever or chills  Chest: Denies shortness of breath, wheezing, coughing, or sputum production  Heart: Denies chest pain, cold extremities, orthopenia, or reduced exercise tolerance  As noted above and per history of current illness above, otherwise negative in the remainder of the 14 systems.     PHYSICAL EXAM  Vitals:    02/01/22 0853   Weight: 86.2 kg (190 lb)   Height: 5' 5" (1.651 m)   PainSc: 10-Worst pain ever       General: This patient is well-developed, well-nourished and appears stated age, well-oriented to person, place and time, and cooperative and pleasant on today's visit    LOWER EXTREMITY PHYSICAL EXAM  VASCULAR  Dorsalis pedis and posterior tibial pulses palpable 2/4 bilaterally.   Capillary refill time immediate to the toes.   Feet are warm to the touch. Skin temperature warm to warm from proximally to distally   There are no varicosities, telangiectasias noted to bilateral foot and ankle regions.   There are no ecchymoses noted to bilateral foot and ankle regions.   There is no gross lower extremity edema.    DERMATOLOGIC  Skin moist with healthy texture and turgor.  There are no open ulcerations, lacerations, or fissures to bilateral foot and ankle regions. There are no signs of infection as there is no erythema, no proximal-extending lymphangiitis, no fluctuance, or crepitus noted on palpation to bilateral foot and ankle regions.   There is no interdigital maceration.   There are no hyperkeratotic lesions noted to feet. Nails are well-trimmed.    NEUROLOGIC  Epicritic sensation is intact as the patient is able to sense light touch to bilateral foot and ankle regions.   Achilles and patellar deep tendon reflexes intact  Babinski reflex absent    ORTHOPEDIC/BIOMECHANICAL  TTP posterior aspect heel at insertion of achilles and TTP of achilles tendon proper on LEFT " foot.  There is no prominence noted at insertion, neg defect palpated, LEFT foot  Muscle strength AT/EHL/EDL/PT: 5/5; Achilles/Gastroc/Soleus: 5/5; PB/PL: 5/5 Muscle tone is normal.  Ankle joint ROM  B/L supple DF/PF, non-crepitus  STJ ROM supple inv/ev, non crepitus b/l.  On stance/Gait: able to stand, heel to toe gait. Able to weightbear, ambulate without assistance      ASSESSMENT  Tendonitis, Achilles, left  -     X-Ray Foot Complete Left; Future; Expected date: 02/01/2022    Other orders  -     methylPREDNISolone (MEDROL DOSEPACK) 4 mg tablet; use as directed  Dispense: 21 tablet; Refill: 0        PLAN    1. Patient was educated about clinical and imaging findings, and verbalizes understanding of above.  2. Treatment plan:   - after obtaining verbal consent to proceed with walking boot application, a CAM walker with heel lift was then applied to the LEFT lower extremity. (pt instructed to wear at night as well) x 4 weeks then transition into heel lift with motion control tennis shoes x 4 weeks  - rest and icex 3 times daily/20 minutes, eccentric exercises/at home rehab instructions  A prescription for medrol dosepak was provided with instructions. Discontinue if stomach irritation  -Will order MRI or ultrasound for pathology of thickening of paratenon if no resolution of pain; physical therapy: cold therapy, ultrasound, stretching, extracorporal shockwave therapy  -if conservative treatment fails, will consider excision of thickened or inflamed paratenon along with release of any associated adhesions and/or retrocalcaneal spur removal if applicable  3. RTC  for follow up/evaluation as scheduled, or sooner if any issues, questions or concerns.    Future Appointments   Date Time Provider Department Center   3/1/2022  8:20 AM Jacinta Hicks DPM [facility] [place]           Report Electronically Signed By:     Jacinta Hicks DPM   Podiatry  [facility]   2/1/2022

## 2022-02-04 ENCOUNTER — OFFICE VISIT (OUTPATIENT)
Dept: OPHTHALMOLOGY | Facility: CLINIC | Age: 52
End: 2022-02-04
Payer: COMMERCIAL

## 2022-02-04 DIAGNOSIS — E11.9 DIABETES MELLITUS WITHOUT COMPLICATION: Primary | ICD-10-CM

## 2022-02-04 DIAGNOSIS — H52.4 PRESBYOPIA OF BOTH EYES: ICD-10-CM

## 2022-02-04 DIAGNOSIS — E11.36 DIABETIC CATARACT: ICD-10-CM

## 2022-02-04 DIAGNOSIS — H40.053 BILATERAL OCULAR HYPERTENSION: ICD-10-CM

## 2022-02-04 PROCEDURE — 92015 PR REFRACTION: ICD-10-PCS | Mod: S$GLB,,, | Performed by: OPTOMETRIST

## 2022-02-04 PROCEDURE — 99999 PR PBB SHADOW E&M-EST. PATIENT-LVL III: CPT | Mod: PBBFAC,,, | Performed by: OPTOMETRIST

## 2022-02-04 PROCEDURE — 1159F PR MEDICATION LIST DOCUMENTED IN MEDICAL RECORD: ICD-10-PCS | Mod: CPTII,S$GLB,, | Performed by: OPTOMETRIST

## 2022-02-04 PROCEDURE — 92015 DETERMINE REFRACTIVE STATE: CPT | Mod: S$GLB,,, | Performed by: OPTOMETRIST

## 2022-02-04 PROCEDURE — 99999 PR PBB SHADOW E&M-EST. PATIENT-LVL III: ICD-10-PCS | Mod: PBBFAC,,, | Performed by: OPTOMETRIST

## 2022-02-04 PROCEDURE — 92004 COMPRE OPH EXAM NEW PT 1/>: CPT | Mod: S$GLB,,, | Performed by: OPTOMETRIST

## 2022-02-04 PROCEDURE — 1159F MED LIST DOCD IN RCRD: CPT | Mod: CPTII,S$GLB,, | Performed by: OPTOMETRIST

## 2022-02-04 PROCEDURE — 92004 PR EYE EXAM, NEW PATIENT,COMPREHESV: ICD-10-PCS | Mod: S$GLB,,, | Performed by: OPTOMETRIST

## 2022-02-07 NOTE — PROGRESS NOTES
HPI     Np to DKT  Patient here today for yearly eye exam  Diagnosed with diabetes in 2012  Lab Results       Component                Value               Date                       HGBA1C                   8.3 (H)             02/24/2017            Vision changes since last eye exam?: yes at near very small writing  Wears SVL glasses full-time     Any eye pain today: No    Other ocular symptoms: No    Interested in contact lens fitting today? No                Last edited by Thais Oliva, PCT on 2/4/2022  3:40 PM. (History)            Assessment /Plan     For exam results, see Encounter Report.    Diabetes mellitus without complication  Last A1c 8.3 Stressed importance of DM control to preserve vision. No diabetic retinopathy was seen in either eye today. Continue strict blood glucose control.  Reviewed importance of yearly dilated eye exams. Continue close care with PCP regarding diabetes.    Diabetic cataract  Cataracts not significantly affecting activities of daily living and therefore surgery is not indicated at this time.   Will continue to monitor over the next 12 months. Pt to call or RTC with any significant change in vision prior to next visit.     Presbyopia of both eyes  Eyeglass Final Rx     Eyeglass Final Rx       Sphere Cylinder Axis Add    Right -0.50 +0.25 135 +2.25    Left -0.50 +0.25 040 +2.25    Type: PAL           Eyeglass Final Rx #2       Sphere Cylinder Axis Add    Right +0.75 +0.25 135     Left +0.75 +0.25 040     Type: SVL Computer                 Bilateral ocular hypertension  Borderline, Observe.     RTC 1 year for CEE with DFE sooner if any changes to vision or worsening symptoms.

## 2022-03-01 ENCOUNTER — OFFICE VISIT (OUTPATIENT)
Dept: PODIATRY | Facility: CLINIC | Age: 52
End: 2022-03-01
Payer: COMMERCIAL

## 2022-03-01 VITALS — HEIGHT: 65 IN | BODY MASS INDEX: 31.65 KG/M2 | WEIGHT: 190 LBS

## 2022-03-01 DIAGNOSIS — M76.62 TENDONITIS, ACHILLES, LEFT: Primary | ICD-10-CM

## 2022-03-01 PROCEDURE — 3008F BODY MASS INDEX DOCD: CPT | Mod: CPTII,S$GLB,, | Performed by: PODIATRIST

## 2022-03-01 PROCEDURE — 1159F PR MEDICATION LIST DOCUMENTED IN MEDICAL RECORD: ICD-10-PCS | Mod: CPTII,S$GLB,, | Performed by: PODIATRIST

## 2022-03-01 PROCEDURE — 4010F ACE/ARB THERAPY RXD/TAKEN: CPT | Mod: CPTII,S$GLB,, | Performed by: PODIATRIST

## 2022-03-01 PROCEDURE — 99999 PR PBB SHADOW E&M-EST. PATIENT-LVL III: CPT | Mod: PBBFAC,,, | Performed by: PODIATRIST

## 2022-03-01 PROCEDURE — 3008F PR BODY MASS INDEX (BMI) DOCUMENTED: ICD-10-PCS | Mod: CPTII,S$GLB,, | Performed by: PODIATRIST

## 2022-03-01 PROCEDURE — 4010F PR ACE/ARB THEARPY RXD/TAKEN: ICD-10-PCS | Mod: CPTII,S$GLB,, | Performed by: PODIATRIST

## 2022-03-01 PROCEDURE — 99213 OFFICE O/P EST LOW 20 MIN: CPT | Mod: S$GLB,,, | Performed by: PODIATRIST

## 2022-03-01 PROCEDURE — 1159F MED LIST DOCD IN RCRD: CPT | Mod: CPTII,S$GLB,, | Performed by: PODIATRIST

## 2022-03-01 PROCEDURE — 99999 PR PBB SHADOW E&M-EST. PATIENT-LVL III: ICD-10-PCS | Mod: PBBFAC,,, | Performed by: PODIATRIST

## 2022-03-01 PROCEDURE — 99213 PR OFFICE/OUTPT VISIT, EST, LEVL III, 20-29 MIN: ICD-10-PCS | Mod: S$GLB,,, | Performed by: PODIATRIST

## 2022-03-01 NOTE — LETTER
March 1, 2022      [facility]  [address]  [place] [address]  Phone: [phone]  Fax: [phone]       Patient: Mildred Meier   YOB: 1970  Date of Visit: 03/01/2022    To Whom It May Concern:    Darrian Meier  was at [facility] on 03/01/2022. The patient may return to work on 03/01/2022 with restrictions. The patient can wear tennis shoes until further notice. If you have any questions or concerns, or if I can be of further assistance, please do not hesitate to contact me.    Sincerely,      Kalpana Mancilla MA

## 2022-03-01 NOTE — PROGRESS NOTES
[facility]  PODIATRIC MEDICINE AND SURGERY    CHIEF COMPLAINT   Chief Complaint   Patient presents with    Follow-up     4 week f/u for achilles tendonitis, improvement, rates pain 3/10, diabetic, wearing walking boot on the left, last seen PCP Dr. Mitchell on 04/22/21       HPI  Mildred Meier is a 51 y.o. female w/ PMH of HTN and DM , who presents today complaining of painful achilles /  posterior aspect of the keft heel.  Pt states she has had this problem for months in the past, but pain resolved..   Pt describes it as a dull, achy, sometimes sharp pain that is worse when she first gets out of bed in the morning then seems to lessen as the day progresses and returns with more activity.  Pt denies any redness, bruising or acute injury.     Modifying Factors (Aggravating): weight bearing   Modifying Factors (Alleviating): tennis shoes;Tylenol     Pain is 10/10 on pain scale    3/1/2022  Patient is here today 4 week follow-up for Achilles tendinitis.  She has been ambulating in boot as prescribed.  She states pain is significantly improved.  She rates pain on average 3/10.    Patient denies other pedal complaints at this time.    Hemoglobin A1C   Date Value Ref Range Status   02/24/2017 8.3 (H) 4.5 - 6.2 % Final     Comment:     According to ADA guidelines, hemoglobin A1C <7.0% represents  optimal control in non-pregnant diabetic patients.  Different  metrics may apply to specific populations.   Standards of Medical Care in Diabetes - 2016.  For the purpose of screening for the presence of diabetes:  <5.7%     Consistent with the absence of diabetes  5.7-6.4%  Consistent with increasing risk for diabetes   (prediabetes)  >or=6.5%  Consistent with diabetes  Currently no consensus exists for use of hemoglobin A1C  for diagnosis of diabetes for children.           PMH  Past Medical History:   Diagnosis Date    Diabetes mellitus, type 2     Hypertension        PROBLEM LIST  Patient Active Problem List     Diagnosis Date Noted    Uncontrolled type 2 diabetes mellitus without complication, without long-term current use of insulin 2017    HTN (hypertension) 10/29/2014    Obesity, Class I, BMI 30-34.9 10/29/2014       MEDS  Current Outpatient Medications on File Prior to Visit   Medication Sig Dispense Refill    blood pressure monitor Kit 1 kit by Misc.(Non-Drug; Combo Route) route as directed. 1 each 0    glimepiride (AMARYL) 4 MG tablet Take 1 tablet by mouth 2 (two) times daily. For diabetes. appt due  tablet 0    glimepiride (AMARYL) 4 MG tablet Take 4 mg by mouth 2 (two) times daily.      lisinopril (PRINIVIL,ZESTRIL) 20 MG tablet Take 1 tablet (20 mg total) by mouth once daily. 90 tablet 0    losartan (COZAAR) 12.5 MG tablet Take 25 mg by mouth once daily.      multivitamin (THERAGRAN) per tablet Take 1 tablet by mouth once daily.      omeprazole (PRILOSEC) 40 MG capsule Take 40 mg by mouth once daily.      semaglutide (OZEMPIC) 1 mg/dose (2 mg/1.5 mL) PnIj Inject into the skin every 7 days.      semaglutide (OZEMPIC) 1 mg/dose (4 mg/3 mL) Inject 1 mg into the skin every 7 days. 3 pen 0    blood sugar diagnostic Strp 1 strip by Misc.(Non-Drug; Combo Route) route 2 (two) times daily. 200 strip 0    lancets (LANCETS,ULTRA THIN) Misc 1 lancet by Misc.(Non-Drug; Combo Route) route 2 (two) times daily. (Patient not taking: Reported on 2022) 200 each 0    metformin (GLUCOPHAGE-XR) 500 MG 24 hr tablet Take 1 tablet (500 mg total) by mouth daily with dinner or evening meal. 90 tablet 1    methylPREDNISolone (MEDROL DOSEPACK) 4 mg tablet use as directed 21 tablet 0     No current facility-administered medications on file prior to visit.       PSH     Past Surgical History:   Procedure Laterality Date     SECTION      HYSTERECTOMY  2012    total/dauterive        ALL  Review of patient's allergies indicates:   Allergen Reactions    Metformin Itching and Rash       SOC    "  Social History     Tobacco Use    Smoking status: Never Smoker    Smokeless tobacco: Never Used   Substance Use Topics    Alcohol use: Yes     Comment: Occasionally    Drug use: No         Family HX    Family History   Problem Relation Age of Onset    Hypertension Mother     Colon cancer Father     Stroke Maternal Aunt     Diabetes Neg Hx     Kidney disease Neg Hx     Cancer Mother             REVIEW OF SYSTEMS  General: Denies any fever or chills  Chest: Denies shortness of breath, wheezing, coughing, or sputum production  Heart: Denies chest pain, cold extremities, orthopenia, or reduced exercise tolerance  As noted above and per history of current illness above, otherwise negative in the remainder of the 14 systems.     PHYSICAL EXAM  Vitals:    03/01/22 0837   Weight: 86.2 kg (190 lb)   Height: 5' 5" (1.651 m)   PainSc:   3       General: This patient is well-developed, well-nourished and appears stated age, well-oriented to person, place and time, and cooperative and pleasant on today's visit    LOWER EXTREMITY PHYSICAL EXAM  VASCULAR  Dorsalis pedis and posterior tibial pulses palpable 2/4 bilaterally.   Capillary refill time immediate to the toes.   Feet are warm to the touch. Skin temperature warm to warm from proximally to distally   There are no varicosities, telangiectasias noted to bilateral foot and ankle regions.   There are no ecchymoses noted to bilateral foot and ankle regions.   There is no gross lower extremity edema.    DERMATOLOGIC  Skin moist with healthy texture and turgor.  There are no open ulcerations, lacerations, or fissures to bilateral foot and ankle regions. There are no signs of infection as there is no erythema, no proximal-extending lymphangiitis, no fluctuance, or crepitus noted on palpation to bilateral foot and ankle regions.   There is no interdigital maceration.   There are no hyperkeratotic lesions noted to feet. Nails are well-trimmed.    NEUROLOGIC  Epicritic " sensation is intact as the patient is able to sense light touch to bilateral foot and ankle regions.   Achilles and patellar deep tendon reflexes intact  Babinski reflex absent    ORTHOPEDIC/BIOMECHANICAL  TTP posterior aspect heel at insertion of achilles and TTP of achilles tendon proper on LEFT foot.  There is no prominence noted at insertion, neg defect palpated, LEFT foot  Muscle strength AT/EHL/EDL/PT: 5/5; Achilles/Gastroc/Soleus: 5/5; PB/PL: 5/5 Muscle tone is normal.  Ankle joint ROM  B/L supple DF/PF, non-crepitus  STJ ROM supple inv/ev, non crepitus b/l.  On stance/Gait: able to stand, heel to toe gait. Able to weightbear, ambulate without assistance      ASSESSMENT  Tendonitis, Achilles, left        PLAN    1. Patient was educated about clinical and imaging findings, and verbalizes understanding of above.  2. Treatment plan:   -continue with eccentric stretches/at home rehab  -transition into tennis shoes  3. RTC PRN    Future Appointments   Date Time Provider Department Center   3/16/2022  1:30 PM Massiel Brenner MD [facility] [place]           Report Electronically Signed By:     Jacinta Hicks DPM   Podiatry  [facility]  3/1/2022

## 2022-03-16 ENCOUNTER — OFFICE VISIT (OUTPATIENT)
Dept: DERMATOLOGY | Facility: CLINIC | Age: 52
End: 2022-03-16
Payer: COMMERCIAL

## 2022-03-16 DIAGNOSIS — L70.0 ACNE VULGARIS: ICD-10-CM

## 2022-03-16 DIAGNOSIS — L71.9 ACNE ROSACEA: Primary | ICD-10-CM

## 2022-03-16 PROCEDURE — 99203 OFFICE O/P NEW LOW 30 MIN: CPT | Mod: S$GLB,,, | Performed by: DERMATOLOGY

## 2022-03-16 PROCEDURE — 99999 PR PBB SHADOW E&M-EST. PATIENT-LVL III: CPT | Mod: PBBFAC,,, | Performed by: DERMATOLOGY

## 2022-03-16 PROCEDURE — 1159F MED LIST DOCD IN RCRD: CPT | Mod: CPTII,S$GLB,, | Performed by: DERMATOLOGY

## 2022-03-16 PROCEDURE — 1160F PR REVIEW ALL MEDS BY PRESCRIBER/CLIN PHARMACIST DOCUMENTED: ICD-10-PCS | Mod: CPTII,S$GLB,, | Performed by: DERMATOLOGY

## 2022-03-16 PROCEDURE — 99203 PR OFFICE/OUTPT VISIT, NEW, LEVL III, 30-44 MIN: ICD-10-PCS | Mod: S$GLB,,, | Performed by: DERMATOLOGY

## 2022-03-16 PROCEDURE — 4010F PR ACE/ARB THEARPY RXD/TAKEN: ICD-10-PCS | Mod: CPTII,S$GLB,, | Performed by: DERMATOLOGY

## 2022-03-16 PROCEDURE — 4010F ACE/ARB THERAPY RXD/TAKEN: CPT | Mod: CPTII,S$GLB,, | Performed by: DERMATOLOGY

## 2022-03-16 PROCEDURE — 1160F RVW MEDS BY RX/DR IN RCRD: CPT | Mod: CPTII,S$GLB,, | Performed by: DERMATOLOGY

## 2022-03-16 PROCEDURE — 99999 PR PBB SHADOW E&M-EST. PATIENT-LVL III: ICD-10-PCS | Mod: PBBFAC,,, | Performed by: DERMATOLOGY

## 2022-03-16 PROCEDURE — 1159F PR MEDICATION LIST DOCUMENTED IN MEDICAL RECORD: ICD-10-PCS | Mod: CPTII,S$GLB,, | Performed by: DERMATOLOGY

## 2022-03-16 RX ORDER — DOXYCYCLINE 100 MG/1
CAPSULE ORAL
Qty: 30 CAPSULE | Refills: 2 | Status: SHIPPED | OUTPATIENT
Start: 2022-03-16 | End: 2022-06-20

## 2022-03-16 RX ORDER — HYDROQUINONE 40 MG/G
CREAM TOPICAL
Qty: 28 G | Refills: 1 | Status: SHIPPED | OUTPATIENT
Start: 2022-03-16

## 2022-03-16 RX ORDER — IVERMECTIN 10 MG/G
CREAM TOPICAL
Qty: 45 G | Refills: 3 | Status: SHIPPED | OUTPATIENT
Start: 2022-03-16 | End: 2022-06-22 | Stop reason: SDUPTHER

## 2022-03-16 NOTE — PATIENT INSTRUCTIONS
"ACNE INSTRUCTIONS  Use hydroquinone bleaching cream to dark spots only each morning.  Use with a sunscreen with SPF 30.    Use soolantra at bedtime. You may also apply a non-comedogenic moisturizer prior to applying the epiduo to help reduce the risk of irritation and dryness.  Use a gentle cleanser twice daily such as CeraVe, Cetaphil, or Elta MD Gentle Foaming Cleanser.  Use CeraVe or Cetaphil lotion or Elta MD AM/PM therapy and use twice a day if dryness occurs.  All skin care products and cosmetics should have the label "non-comdeogenic" which means it will not clog your pores.      SHEER SUNSCREENS    Black Girl Sunscreen SPF 30  Cachorro Brightening Moisturizer SPF 30  Umbra Sheer Physical Daily Defense SPF 30  Shiseido Ultimate Sun Protection Lotion WetForce SPF 50+  Supergoop! Unseen Sunscreen Broad Spectrum SPF 40  Neutrogena HydroBoost Water Gel Lotion SPF 50  Neutrogena Ultrasheer Face & Body Stick SPF 70        "

## 2022-03-16 NOTE — PROGRESS NOTES
Subjective:       Patient ID:  Mildred Meier is a 51 y.o. female who presents for   Chief Complaint   Patient presents with    Acne     Face ; [facility] and Cetaphil      History of Present Illness: The patient presents with chief complaint of acne, discoloration.  Location: face  Duration: 2 months  Signs/Symptoms: discloration    Prior treatments: [facility] rx (entire line), cetaphil purifyng face mask        Review of Systems   Constitutional: Negative for fever and chills.   Gastrointestinal: Negative for nausea and vomiting.   Skin: Positive for activity-related sunscreen use. Negative for daily sunscreen use and recent sunburn.   Hematologic/Lymphatic: Does not bruise/bleed easily.        Objective:    Physical Exam   Constitutional: She appears well-developed and well-nourished. No distress.   Neurological: She is alert and oriented to person, place, and time. She is not disoriented.   Psychiatric: She has a normal mood and affect.   Skin:   Areas Examined (abnormalities noted in diagram):   Head / Face Inspection Performed  Neck Inspection Performed  RUE Inspected  LUE Inspection Performed  Nails and Digits Inspection Performed              Diagram Legend     Open and closed comedones      Inflammatory papules and pustules       Assessment / Plan:        Acne rosacea  Acne vulgaris  -     doxycycline (MONODOX) 100 MG capsule; Take once daily with food.  May cause upset stomach.  Dispense: 30 capsule; Refill: 2  -     ivermectin (SOOLANTRA) 1 % Crea; AAA face qhs  Dispense: 45 g; Refill: 3  -     hydroquinone 4 % Crea; Apply to dark spots once daily. Use with sunscreen if outdoors  Dispense: 28 g; Refill: 1  -     Recommend d/c picking areas.  Will start above med with gentle cleanser and lotion and daily sunscreen.              Follow up in about 3 months (around 6/16/2022).

## 2022-06-22 DIAGNOSIS — L70.0 ACNE VULGARIS: ICD-10-CM

## 2022-06-22 DIAGNOSIS — L71.9 ACNE ROSACEA: ICD-10-CM

## 2022-06-23 RX ORDER — DOXYCYCLINE 100 MG/1
CAPSULE ORAL
Qty: 30 CAPSULE | Refills: 2 | Status: SHIPPED | OUTPATIENT
Start: 2022-06-23 | End: 2022-06-24 | Stop reason: SDUPTHER

## 2022-06-23 RX ORDER — IVERMECTIN 10 MG/G
CREAM TOPICAL
Qty: 45 G | Refills: 3 | Status: SHIPPED | OUTPATIENT
Start: 2022-06-23 | End: 2022-07-26

## 2022-06-23 RX ORDER — HYDROQUINONE 40 MG/G
CREAM TOPICAL
Qty: 28 G | Refills: 1 | OUTPATIENT
Start: 2022-06-23

## 2022-06-24 DIAGNOSIS — L70.0 ACNE VULGARIS: ICD-10-CM

## 2022-06-24 DIAGNOSIS — L71.9 ACNE ROSACEA: ICD-10-CM

## 2022-06-27 RX ORDER — DOXYCYCLINE 100 MG/1
CAPSULE ORAL
Qty: 30 CAPSULE | Refills: 2 | Status: SHIPPED | OUTPATIENT
Start: 2022-06-27 | End: 2022-06-28 | Stop reason: SDUPTHER

## 2022-06-28 DIAGNOSIS — L70.0 ACNE VULGARIS: ICD-10-CM

## 2022-06-28 DIAGNOSIS — L71.9 ACNE ROSACEA: ICD-10-CM

## 2022-06-29 ENCOUNTER — HOSPITAL ENCOUNTER (OUTPATIENT)
Dept: RADIOLOGY | Facility: HOSPITAL | Age: 52
Discharge: HOME | End: 2022-06-29
Attending: STUDENT IN AN ORGANIZED HEALTH CARE EDUCATION/TRAINING PROGRAM
Payer: COMMERCIAL

## 2022-06-29 DIAGNOSIS — M79.621 PAIN OF RIGHT UPPER ARM: ICD-10-CM

## 2022-06-29 DIAGNOSIS — M25.511 RIGHT SHOULDER PAIN, UNSPECIFIED CHRONICITY: Primary | ICD-10-CM

## 2022-06-29 DIAGNOSIS — M25.511 RIGHT SHOULDER PAIN, UNSPECIFIED CHRONICITY: ICD-10-CM

## 2022-06-29 PROCEDURE — 73060 X-RAY EXAM OF HUMERUS: CPT | Mod: 26,RT,, | Performed by: RADIOLOGY

## 2022-06-29 PROCEDURE — 73060 XR HUMERUS 2 VIEW RIGHT: ICD-10-PCS | Mod: 26,RT,, | Performed by: RADIOLOGY

## 2022-06-29 PROCEDURE — 73030 XR SHOULDER COMPLETE 2 OR MORE VIEWS RIGHT: ICD-10-PCS | Mod: 26,RT,, | Performed by: RADIOLOGY

## 2022-06-29 PROCEDURE — 73030 X-RAY EXAM OF SHOULDER: CPT | Mod: TC,RT

## 2022-06-29 PROCEDURE — 73030 X-RAY EXAM OF SHOULDER: CPT | Mod: 26,RT,, | Performed by: RADIOLOGY

## 2022-06-29 PROCEDURE — 73060 X-RAY EXAM OF HUMERUS: CPT | Mod: TC,RT

## 2022-06-29 RX ORDER — DOXYCYCLINE 100 MG/1
CAPSULE ORAL
Qty: 30 CAPSULE | Refills: 2 | Status: SHIPPED | OUTPATIENT
Start: 2022-06-29

## 2022-06-30 ENCOUNTER — OFFICE VISIT (OUTPATIENT)
Dept: SPORTS MEDICINE | Facility: CLINIC | Age: 52
End: 2022-06-30
Payer: COMMERCIAL

## 2022-06-30 VITALS — WEIGHT: 192.88 LBS | BODY MASS INDEX: 32.14 KG/M2 | RESPIRATION RATE: 18 BRPM | HEIGHT: 65 IN

## 2022-06-30 DIAGNOSIS — M67.911 TENDINOPATHY OF RIGHT ROTATOR CUFF: ICD-10-CM

## 2022-06-30 DIAGNOSIS — M75.01 ADHESIVE CAPSULITIS OF RIGHT SHOULDER: Primary | ICD-10-CM

## 2022-06-30 DIAGNOSIS — G89.29 CHRONIC RIGHT SHOULDER PAIN: ICD-10-CM

## 2022-06-30 DIAGNOSIS — M25.511 CHRONIC RIGHT SHOULDER PAIN: ICD-10-CM

## 2022-06-30 DIAGNOSIS — M75.21 BICEPS TENDONITIS ON RIGHT: ICD-10-CM

## 2022-06-30 PROCEDURE — 99999 PR PBB SHADOW E&M-EST. PATIENT-LVL IV: CPT | Mod: PBBFAC,,, | Performed by: STUDENT IN AN ORGANIZED HEALTH CARE EDUCATION/TRAINING PROGRAM

## 2022-06-30 PROCEDURE — 3008F BODY MASS INDEX DOCD: CPT | Mod: CPTII,S$GLB,, | Performed by: STUDENT IN AN ORGANIZED HEALTH CARE EDUCATION/TRAINING PROGRAM

## 2022-06-30 PROCEDURE — 99999 PR PBB SHADOW E&M-EST. PATIENT-LVL IV: ICD-10-PCS | Mod: PBBFAC,,, | Performed by: STUDENT IN AN ORGANIZED HEALTH CARE EDUCATION/TRAINING PROGRAM

## 2022-06-30 PROCEDURE — 99204 PR OFFICE/OUTPT VISIT, NEW, LEVL IV, 45-59 MIN: ICD-10-PCS | Mod: S$GLB,,, | Performed by: STUDENT IN AN ORGANIZED HEALTH CARE EDUCATION/TRAINING PROGRAM

## 2022-06-30 PROCEDURE — 4010F PR ACE/ARB THEARPY RXD/TAKEN: ICD-10-PCS | Mod: CPTII,S$GLB,, | Performed by: STUDENT IN AN ORGANIZED HEALTH CARE EDUCATION/TRAINING PROGRAM

## 2022-06-30 PROCEDURE — 1159F MED LIST DOCD IN RCRD: CPT | Mod: CPTII,S$GLB,, | Performed by: STUDENT IN AN ORGANIZED HEALTH CARE EDUCATION/TRAINING PROGRAM

## 2022-06-30 PROCEDURE — 1159F PR MEDICATION LIST DOCUMENTED IN MEDICAL RECORD: ICD-10-PCS | Mod: CPTII,S$GLB,, | Performed by: STUDENT IN AN ORGANIZED HEALTH CARE EDUCATION/TRAINING PROGRAM

## 2022-06-30 PROCEDURE — 4010F ACE/ARB THERAPY RXD/TAKEN: CPT | Mod: CPTII,S$GLB,, | Performed by: STUDENT IN AN ORGANIZED HEALTH CARE EDUCATION/TRAINING PROGRAM

## 2022-06-30 PROCEDURE — 3008F PR BODY MASS INDEX (BMI) DOCUMENTED: ICD-10-PCS | Mod: CPTII,S$GLB,, | Performed by: STUDENT IN AN ORGANIZED HEALTH CARE EDUCATION/TRAINING PROGRAM

## 2022-06-30 PROCEDURE — 99204 OFFICE O/P NEW MOD 45 MIN: CPT | Mod: S$GLB,,, | Performed by: STUDENT IN AN ORGANIZED HEALTH CARE EDUCATION/TRAINING PROGRAM

## 2022-06-30 RX ORDER — MELOXICAM 15 MG/1
15 TABLET ORAL DAILY
Qty: 30 TABLET | Refills: 1 | Status: SHIPPED | OUTPATIENT
Start: 2022-06-30 | End: 2022-10-25

## 2022-06-30 NOTE — PATIENT INSTRUCTIONS
Assessment:  Mildred Meier is a 51 y.o. female   Chief Complaint   Patient presents with    Right Shoulder - Pain     radiates to bicep       Encounter Diagnoses   Name Primary?    Chronic right shoulder pain Yes    Adhesive capsulitis of right shoulder         Plan:  Discussed the process of frozen shoulder with patient. Need to see latest A1C labs prior to cortisone injection.  A1C needs to be below 8.  If A1C is below 8, will perform subacromial injection.  Refer to physical therapy at [facility]   Oral prescription for NSAIDS  An order for Physical Therapy within the [facility] system was placed today.  Please expect a call from our Centralized Scheduling number, [phone].  If you do not hear from them in the next few days, please call our local PT department directly at [phone].        Follow-up:  Once you get your latest A1C results, if below 8, contact our office and we will set up an appointment for an injection.  Currently have an appointment set up for 8-10 weeks or sooner if there are any problems between now and then.    Thank you for choosing [facility] and Dr. Jamie Olmstead for your orthopedic & sports medicine care. It is our goal to provide you with exceptional care that will help keep you healthy, active, and get you back in the game.    Please do not hesitate to reach out to us via email, phone, or MyChart with any questions, concerns, or feedback.    If you felt that you received exemplary care today, please consider leaving us feedback on Healthgrades at:  [email or web address]    If you are experiencing pain/discomfort ,or have questions after 5pm and would like to be connected to the [facility] on-call team, please call this number and specify which Sports Medicine provider is treating you: [phone]     CLAUDIA, Valentine Swenson, acted as a scribe for Dr. Jamie Olmstead for the duration of this office  visit.

## 2022-06-30 NOTE — PROGRESS NOTES
Patient ID: Mildred Meier  YOB: 1970  MRN: [id number]    Chief Complaint: Pain of the Right Shoulder (radiates to bicep)    Referred By:  self for Right Shoulder Pain    History of Present Illness: Mildred Meier is a right-hand dominant 51 y.o. female who presents today with Right Shoulder Pain. Patient states that she has had right shoulder pain for approximately 1 year but last 2 months started to really bother her.  She states that over the last 2 days she has been unable to lift the arm over her head.  Pain is intermittent and aching in nature and she has been taking tylenol to help alleviate the pain.  She also has been using Biofreeze roll on at night to help with pain control.  She states that movement over head and out to the side of her body increases her pain and in the morning and at night the pain is worse.  She denies numbness or tingling into her arm, hand or fingers.      The patient is active in none.  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      Medication List with Changes/Refills   Current Medications    BLOOD PRESSURE MONITOR KIT    1 kit by Misc.(Non-Drug; Combo Route) route as directed.    BLOOD SUGAR DIAGNOSTIC STRP    1 strip by Misc.(Non-Drug; Combo Route) route 2 (two) times daily.    DOXYCYCLINE (MONODOX) 100 MG CAPSULE    TAKE 1 CAPSULE BY MOUTH EVERY DAY WITH FOOD. MAY CAUSE UPSET STOMACH.    GLIMEPIRIDE (AMARYL) 4 MG TABLET    Take 1 tablet by mouth 2 (two) times daily. For diabetes. appt due december    GLIMEPIRIDE (AMARYL) 4 MG TABLET    Take 4 mg by mouth 2 (two) times daily.    HYDROQUINONE 4 % CREA    Apply to dark spots once daily. Use with sunscreen if outdoors    IVERMECTIN (SOOLANTRA) 1 % CREA    AAA face qhs    LANCETS (LANCETS,ULTRA THIN) MISC    1 lancet by Misc.(Non-Drug; Combo Route) route 2 (two) times daily.    LISINOPRIL (PRINIVIL,ZESTRIL) 20 MG TABLET    Take 1 tablet (20 mg total) by mouth once daily.    LOSARTAN (COZAAR) 12.5 MG TABLET    Take 25 mg by mouth once daily.    METFORMIN (GLUCOPHAGE-XR) 500 MG 24 HR TABLET    Take 1 tablet (500 mg total) by mouth daily with dinner or evening meal.    METHYLPREDNISOLONE (MEDROL DOSEPACK) 4 MG TABLET    use as directed    MULTIVITAMIN (THERAGRAN) PER TABLET    Take 1 tablet by mouth once daily.    OMEPRAZOLE (PRILOSEC) 40 MG CAPSULE    Take 40 mg by mouth once daily.    SEMAGLUTIDE (OZEMPIC) 1 MG/DOSE (2 MG/1.5 ML) PNIJ    Inject into the skin every 7 days.     Review of patient's allergies indicates:   Allergen Reactions    Metformin Itching and Rash       Physical Exam:   Body mass index is 32.1 kg/m².    GENERAL: Well appearing, in no acute distress.  HEAD: Normocephalic and atraumatic.  ENT: External ears and nose grossly normal.  EYES: EOMI bilaterally  PULMONARY: Respirations are grossly even and non-labored.  NEURO: Awake, alert, and oriented x 3.  SKIN: No obvious rashes appreciated.  PSYCH: Mood & affect are appropriate.    Detailed MSK exam:     AROM   Flexion  130°  IR limited to back waistline  Starting to feel resistance with PROM on flexion at approximately 160 degrees   Good strength with rotator cuff testing positive empty can for pain but good strength appreciated positive Neer's positive Casiano positive speed's equivocal Orlando's tenderness over the AC joint mild tenderness over the bicipital    Imaging:  X-ray Shoulder 2 or More Views Right  Narrative: EXAMINATION:  XR SHOULDER COMPLETE 2 OR MORE VIEWS RIGHT    CLINICAL HISTORY:  Pain in right shoulder    TECHNIQUE:  Two or three views of the right shoulder were preformed.    COMPARISON:  None    FINDINGS:  There is no radiographic evidence of acute osseous, articular, or soft tissue abnormality.  Mild-to-moderate degenerative findings are present at the AC and glenohumeral joints.  Impression: Degenerative findings as above    Electronically signed by: Blane Tabares MD  Date:    06/29/2022  Time:    13:01  X-Ray Humerus 2 View Right  Narrative: EXAMINATION:  XR HUMERUS 2 VIEW RIGHT    CLINICAL HISTORY:  Pain in right upper arm    TECHNIQUE:  Two views of the right humerus were obtained.    COMPARISON:  None    FINDINGS:  There is no radiographic evidence of acute osseous, articular, or soft tissue abnormality.  There are mild degenerative findings present at the right shoulder.  Impression: As above.  No acute findings.    Electronically signed by: Blane Tabares MD  Date:    06/29/2022  Time:    13:00      Relevant imaging results were reviewed and interpreted by me and per my read as above.  This was discussed with the patient and / or family today.     Assessment:  Mildred Meier is a 51 y.o. female presents today for right shoulder pain.  She has likely had some rotator cuff tendinopathy over last year but has progressed over last few days to months and has significantly worsened as well as some loss of passive and active range of motion today.  It is her dominant hand but she is a diabetic in middle  age female which was read higher risk for adhesive capsulitis.  I believe she has some early adhesive capsulitis of the right shoulder setting and discussed the diagnosis prognosis as well as conservative treatment options moving forward.  I discussed formal physical therapy and short course of oral anti-inflammatories.  Discussed getting her A1c results from her diabetic provider and if under 8.0 I am happy to perform a subacromial injection to start.  If still having persistent symptoms and continued loss of range of motion would consider a brisement at that time.    Adhesive capsulitis of right shoulder  -     Ambulatory referral/consult to Physical/Occupational Therapy; Future; Expected date: 07/07/2022  -     meloxicam (MOBIC) 15 MG tablet; Take 1 tablet (15 mg total) by mouth once daily.  Dispense: 30 tablet; Refill: 1    Chronic right shoulder pain    Biceps tendonitis on right  -     Ambulatory referral/consult to Physical/Occupational Therapy; Future; Expected date: 07/07/2022  -     meloxicam (MOBIC) 15 MG tablet; Take 1 tablet (15 mg total) by mouth once daily.  Dispense: 30 tablet; Refill: 1    Tendinopathy of right rotator cuff  -     Ambulatory referral/consult to Physical/Occupational Therapy; Future; Expected date: 07/07/2022  -     meloxicam (MOBIC) 15 MG tablet; Take 1 tablet (15 mg total) by mouth once daily.  Dispense: 30 tablet; Refill: 1       I spent a total of 45 minutes on the day of the visit.  This includes face to face time and non-face to face time preparing to see the patient (eg, review of tests), obtaining and/or reviewing separately obtained history, documenting clinical information in the electronic or other health record, independently interpreting results and communicating results to the patient/family/caregiver, or care coordinator.    A copy of today's visit note has been sent to the referring provider.       Jamie Olmstead MD    Disclaimer: This note was prepared using a voice  recognition system and is likely to have sound alike errors within the text.

## 2022-09-26 ENCOUNTER — OFFICE VISIT (OUTPATIENT)
Dept: PODIATRY | Facility: CLINIC | Age: 52
End: 2022-09-26
Payer: COMMERCIAL

## 2022-09-26 VITALS — WEIGHT: 192 LBS | HEIGHT: 65 IN | BODY MASS INDEX: 31.99 KG/M2

## 2022-09-26 DIAGNOSIS — B35.1 DERMATOPHYTOSIS OF NAIL: ICD-10-CM

## 2022-09-26 DIAGNOSIS — L60.8 DISCOLORATION AND THICKENING OF NAILS BOTH FEET: Primary | ICD-10-CM

## 2022-09-26 PROCEDURE — 99214 OFFICE O/P EST MOD 30 MIN: CPT | Mod: S$GLB,,, | Performed by: PODIATRIST

## 2022-09-26 PROCEDURE — 1159F PR MEDICATION LIST DOCUMENTED IN MEDICAL RECORD: ICD-10-PCS | Mod: CPTII,S$GLB,, | Performed by: PODIATRIST

## 2022-09-26 PROCEDURE — 99999 PR PBB SHADOW E&M-EST. PATIENT-LVL IV: ICD-10-PCS | Mod: PBBFAC,,, | Performed by: PODIATRIST

## 2022-09-26 PROCEDURE — 87107 FUNGI IDENTIFICATION MOLD: CPT | Performed by: PODIATRIST

## 2022-09-26 PROCEDURE — 3008F BODY MASS INDEX DOCD: CPT | Mod: CPTII,S$GLB,, | Performed by: PODIATRIST

## 2022-09-26 PROCEDURE — 1159F MED LIST DOCD IN RCRD: CPT | Mod: CPTII,S$GLB,, | Performed by: PODIATRIST

## 2022-09-26 PROCEDURE — 3008F PR BODY MASS INDEX (BMI) DOCUMENTED: ICD-10-PCS | Mod: CPTII,S$GLB,, | Performed by: PODIATRIST

## 2022-09-26 PROCEDURE — 4010F PR ACE/ARB THEARPY RXD/TAKEN: ICD-10-PCS | Mod: CPTII,S$GLB,, | Performed by: PODIATRIST

## 2022-09-26 PROCEDURE — 4010F ACE/ARB THERAPY RXD/TAKEN: CPT | Mod: CPTII,S$GLB,, | Performed by: PODIATRIST

## 2022-09-26 PROCEDURE — 99999 PR PBB SHADOW E&M-EST. PATIENT-LVL IV: CPT | Mod: PBBFAC,,, | Performed by: PODIATRIST

## 2022-09-26 PROCEDURE — 87101 SKIN FUNGI CULTURE: CPT | Performed by: PODIATRIST

## 2022-09-26 PROCEDURE — 99214 PR OFFICE/OUTPT VISIT, EST, LEVL IV, 30-39 MIN: ICD-10-PCS | Mod: S$GLB,,, | Performed by: PODIATRIST

## 2022-09-26 RX ORDER — CICLOPIROX 80 MG/ML
SOLUTION TOPICAL
Qty: 6.6 ML | Refills: 11 | Status: SHIPPED | OUTPATIENT
Start: 2022-09-26

## 2022-09-26 NOTE — PROGRESS NOTES
[facility]  PODIATRIC MEDICINE AND SURGERY      CHIEF COMPLAINT   Chief Complaint   Patient presents with    Nail Problem     C/o great toenail discoloration, b/l, 0 pain, x 1.5 months, pt states that she has been applying Lotrim to the toenails, pain in the achilles started again, left, pt states that she has started her stretching again, rates pain 4/10, diabetic, wears tennis and socks         HPI:    Mildred Meier is a 51 y.o. female presenting to podiatry clinic with complaint of fungal infection on both great toenails. The patient has tried over the counter medications with some success, but the problem is recurrent and aggravating. Patient inquires about available treatment options. No further pedal complaints.      PMH  Past Medical History:   Diagnosis Date    Diabetes mellitus, type 2     Hypertension        PROBLEM LIST  Patient Active Problem List    Diagnosis Date Noted    Uncontrolled type 2 diabetes mellitus without complication, without long-term current use of insulin 03/06/2017    HTN (hypertension) 10/29/2014    Obesity, Class I, BMI 30-34.9 10/29/2014       MEDS  Current Outpatient Medications on File Prior to Visit   Medication Sig Dispense Refill    blood pressure monitor Kit 1 kit by Misc.(Non-Drug; Combo Route) route as directed. 1 each 0    doxycycline (MONODOX) 100 MG capsule TAKE 1 CAPSULE BY MOUTH EVERY DAY WITH FOOD. MAY CAUSE UPSET STOMACH. 30 capsule 2    glimepiride (AMARYL) 4 MG tablet Take 1 tablet by mouth 2 (two) times daily. For diabetes. appt due december 180 tablet 0    glimepiride (AMARYL) 4 MG tablet Take 4 mg by mouth 2 (two) times daily.      hydroquinone 4 % Crea Apply to dark spots once daily. Use with sunscreen if outdoors 28 g 1    ivermectin (SOOLANTRA) 1 % Crea APPLY TO AFFECTED AREA OF FACE EVERY NIGHT AT BEDTIME AS DIRECTED **EXTERNAL USE ONLY** 45 g 3    losartan (COZAAR) 12.5 MG tablet Take 25 mg by mouth once daily.      meloxicam (MOBIC) 15 MG  tablet Take 1 tablet (15 mg total) by mouth once daily. 30 tablet 1    multivitamin (THERAGRAN) per tablet Take 1 tablet by mouth once daily.      omeprazole (PRILOSEC) 40 MG capsule Take 40 mg by mouth once daily.      semaglutide (OZEMPIC) 1 mg/dose (2 mg/1.5 mL) PnIj Inject into the skin every 7 days.      blood sugar diagnostic Strp 1 strip by Misc.(Non-Drug; Combo Route) route 2 (two) times daily. 200 strip 0    lancets (LANCETS,ULTRA THIN) Misc 1 lancet by Misc.(Non-Drug; Combo Route) route 2 (two) times daily. 200 each 0    lisinopril (PRINIVIL,ZESTRIL) 20 MG tablet Take 1 tablet (20 mg total) by mouth once daily. 90 tablet 0    metformin (GLUCOPHAGE-XR) 500 MG 24 hr tablet Take 1 tablet (500 mg total) by mouth daily with dinner or evening meal. 90 tablet 1    methylPREDNISolone (MEDROL DOSEPACK) 4 mg tablet use as directed 21 tablet 0     No current facility-administered medications on file prior to visit.       PSH     Past Surgical History:   Procedure Laterality Date     SECTION      HYSTERECTOMY  2012    total/dauterive        ALL  Review of patient's allergies indicates:   Allergen Reactions    Metformin Itching and Rash       SOC     Social History     Tobacco Use    Smoking status: Never    Smokeless tobacco: Never   Substance Use Topics    Alcohol use: Yes     Comment: Occasionally    Drug use: No         Family HX    Family History   Problem Relation Age of Onset    Hypertension Mother     Colon cancer Father     Stroke Maternal Aunt     Diabetes Neg Hx     Kidney disease Neg Hx     Cancer Mother             REVIEW OF SYSTEMS  General: Denies any fever or chills  Chest: Denies shortness of breath, wheezing, coughing, or sputum production  Heart: Denies chest pain, cold extremities, orthopenia, or reduced exercise tolerance  As noted above and per history of current illness above, otherwise negative in the remainder of the 14 systems.      PHYSICAL EXAM:      Vitals:    22 1108  "  Weight: 87.1 kg (192 lb)   Height: 5' 5" (1.651 m)   PainSc:   4       General: This patient is well-developed, well-nourished and appears stated age, well-oriented to person, place and time, and cooperative and pleasant on today's visit    LOWER EXTREMITY PHYSICAL EXAM  VASCULAR  Dorsalis pedis and posterior tibial pulses palpable 2/4 bilaterally.   Capillary refill time immediate to the toes.   Feet are warm to the touch. Skin temperature warm to warm from proximally to distally   There are no varicosities, telangiectasias noted to bilateral foot and ankle regions.   There are no ecchymoses noted to bilateral foot and ankle regions.   There is no gross lower extremity edema.    DERMATOLOGIC  There are thickened discolored, elongated mycotic nails suggestive of onychomycosis b/l hallux   Skin moist with healthy texture and turgor.  There are no open ulcerations, lacerations, or fissures to bilateral foot and ankle regions. There are no signs of infection as there is no erythema, no proximal-extending lymphangiitis, no fluctuance, or crepitus noted on palpation to bilateral foot and ankle regions.   There is no interdigital maceration.   There are no hyperkeratotic lesions noted to feet.     NEUROLOGIC  Epicritic sensation is intact as the patient is able to sense light touch to bilateral foot and ankle regions.   Achilles and patellar deep tendon reflexes intact  Babinski reflex absent    ORTHOPEDIC/BIOMECHANICAL  No symptomatic structural abnormalities noted  Muscle strength AT/EHL/EDL/PT: 5/5; Achilles/Gastroc/Soleus: 5/5; PB/PL: 5/5 Muscle tone is normal.  Ankle joint ROM INTACT DF/PF, non-crepitus      ASSESSMENT   Discoloration and thickening of nails both feet  -     Fungal culture , skin, hair, or nails    Dermatophytosis of nail  -     Fungal culture , skin, hair, or nails    Other orders  -     ciclopirox (PENLAC) 8 % Soln; Apply to affected toenails at night time DAILY. On 7th day, file nails down, " clean all nails with alcohol and restart application process.  Dispense: 6.6 mL; Refill: 11      PLAN    1. Patient was educated about clinical and imaging findings, and verbalizes understanding of above.  2. I Discussed treatment options for nail fungus.   -I explained that fungus lives in a warm dark moist environment and therefore patient should make every attempt to keep feet clean and dry.  We discussed drying feet thoroughly after shower particularly between the toes and then applying powder between the toes and in the shoes. I also discussed to disinfect shower tub, discard old shoes, and disinfect current shoes with antiseptic  -For fungal toenails I prescribed PENLAC nail lacquer to be used daily for up to a year.  I have provided the patient written instructions on topical solution application   I also discussed oral Lamisil but I did not recommend it as a first line of treatment since it is an internal medicine that may potentially have side effects, including liver problems.   -Patient elects for topical  treatment.  -Fungal nail culture taken for microbiological identification of nail sample.   -We also discussed final resolution of nail dystrophy via total nail removal either temporary or permanent. Patient was informed that nail fungus might return even after nail removal, however increased prognosis in resolution of nail fungus with combination therapy.    3. RTC  for follow up/evaluation as scheduled      Report Electronically Signed By:     Jacinta Hicks DPM   Podiatry  [facility]  9/26/2022

## 2022-09-26 NOTE — PATIENT INSTRUCTIONS
PENLAC ANTI-FUNGAL TOPICAL INSTRUCTIONS   1. You have been prescribed Penlac nail lacquer (Ciclopirox) topical solution 8%. Go and  the prescription from your local pharmacy.    2. Remove any nail polish on your feet. File away (with emery board) loose nail material and trim nails.    3. Apply the Penlac nail lacquer (ciclopirox) Topical Solution, 8% once daily (preferably at bedtime or eight hours before washing) to all affected nails with the applicator brush provided. The nail lacquer should be applied evenly over the entire nail plate. If possible, apply the solution to the nail bed, hyponychium, and the under surface of the nail plate when if your nail is loosely attached. Remember fungus lives under the nail plate, therefore the more the solution penetrates the nail bed, the better.    3. Continue to apply the solution daily (at bedtime preferably).  Daily applications should be made over the previous coat and removed with alcohol every seven days. This cycle should be repeated throughout the duration of therapy.     4. On the 7th day, remove the solution from all of your nails with alcohol. File your nails again with an emery board and then repeat the cycle again.    5. This treatment must be consistent. If you skip a day, continue the cycle as recommended. It may take up to 1 year for your nails to clear the fungal infection. Be patient.    It is recommended to dispose of all infected shoe gear that you will not likely wear again as well as weekly cleansing of all showering/bathing areas with antiseptics.Fungal spores like to hide in dark, warm, moist environments. Lastly, monthly treatments of all current shoe gear with moth balls x 8 hours.                  FUNGAL NAIL INFECTIONS    If your nail is thick and looks white or yellow, it may be infected with fungus. Nail   infections don't look pleasant, but they are not serious. There are treatments that   can clear up the infection.     What is a  fungal nail infection?   It's easy to catch a fungal nail infection, and lots of people get them. The sooner you   treat an infection, the easier it is to get rid of it. The fungi that cause these infections often live in warm, damp places, such as showers and floors around changing rooms.   People used to think there was nothing you could do about a fungal nail infection. But   there are now good treatments that can get rid of it. However, they take a long time to   work (as long as one year if the infection is bad). So you need to be patient.    Fungal infection of the nails causes changes in the appearance of fingernails and toenails. They may thicken, discolor, change shape or split. This condition is difficult to treat because nails grow slowly and have limited blood supply. It is common to have the infection come back after treatment.       What are the symptoms?   Your nail may look whitish or yellowish, and raised up from the finger or toe. The skin   around your nail may turn red. Sometimes the nail lifts off altogether. If the infection is   severe, the nail may also be crumbly. It's more common to get an infection of a toenail   than a fingernail. If you've had an infection for a long time, it may be painful. If you have a badly infected toe, it may be difficult to walk. If your immune system is weak or you have diabetes, you may be more likely to get these infections. The infection can also be more serious. So it's important to see your doctor as soon as possible if you think you have a nail infection.     What treatments work?   To get rid of a fungal nail infection you will probably have to take tablets, sometimes for   several months. There are also topical solutions (over the counter and prescription) that  you can put on your nail, and  things you can do to try to avoid getting another nail infection.    There are two types of medicines used.   Topical anti-fungal medicines are applied to the  "surface of the skin and nail area. These medicines are not very effective because they cannot get deep into the nail.     Topical medicines are most useful in combination with oral medicines . Oral antifungal medicines are more effective because they penetrate the nail from the inside out.  If medicines fail, the nail can be removed surgically or chemically. This improves the effectiveness of medical treatment because the fungus is physically removed from the body.       Tablets   The tablets that doctors use most often are called itraconazole (brand name Sporanox)   and terbinafine (Lamisil). Terbinafine seems to work slightly better. If you take terbinafine every day for 12 weeks, there's a 6 in 10 chance you'll get rid of   your fungal toenail infection.      How are fungal nail infections treated? -- Treatment depends, in part, on how severe the infection is, and how much it bothers you. If your infection is mild or doesn't bother you very much, you might choose not to treat it. An untreated nail infection probably won't go away, but it probably won't cause any long-term problems either.  When people need or choose to have treatment, it usually involves "antifungal" medicines that you get with a prescription from your doctor. These medicines are taken by mouth or put on the nail.Treatment with pills usually lasts a few months. Some people who take these medicines need to have blood tests. That's because these medicines can affect the liver.  If you don't want to or can't take antifungal pills, your doctor will talk with you about other treatment options. These might include using an antifungal medicine on the nail or having surgery to remove your nail.    Before starting any of these treatments, you should know that:  ?It can take many months for your nail to look normal again.  ?There is a chance that the treatment won't work. The infection might not get better, or it might come back. If either of these things " happen, your doctor can try another treatment or send you to a specialist.  Can fungal nail infections be prevented? -- Sometimes. To reduce your chance of getting one, you can:  ?Keep your feet clean and dry.  ?Avoid sharing nail tools, such as clippers and scissors.  ?Wear flip-flops or other footwear in a gym shower or locker room.  What if I want to get pregnant? -- If you want to get pregnant, let your doctor or nurse know. He or she might recommend that you not take certain antifungal medicines during pregnancy.    Alternative final options are:   If still no resolution with oral tablets or topics: then we can completely avulse/remove all involved toenails with subsequent treatment with topical antifungal solution.       Home Care:   1) Use medicines exactly as directed for as long as directed. Treating a fungal infection can take longer than other kinds of infections.   2) Smoking is a risk factor for fungal infection. This is one more reason to quit.   3) Wear absorbent socks and shoes that have ventilation. Sweaty feet increases risk of fungal infection and make an existing infection harder to treat.   4) Use footwear when in damp public places like swimming pools, gyms and shower rooms. This helps avoid exposure to the fungus that grows there.   It is recommended to dispose of all infected shoe gear that you will not likely wear again as well as weekly cleansing of all showering/bathing areas with antiseptics.Fungal spores like to hide in dark, warm, moist environments. Lastly, monthly treatments of all current shoe gear with moth balls x 8 hours.       Follow Up   with your doctor as directed by our staff.   Get Prompt Medical Attention   if any of the following occur:   -- Skin alongside the nail becomes reddened, swollen, painful or drains pus   -- Side effects from oral anti-fungal medicines       © 7901-7273 [employer] [address] [place] [address], [place] [address]. All rights reserved.  This information is not intended as a substitute for professional medical care. Always follow your healthcare professional's instructions.

## 2022-10-19 NOTE — PROGRESS NOTES
Subjective:      Mildred Meier is a 52 y.o. female here today for:  Urinary Tract Infection    HPI    Mildred is here with c/o possible UTI.  Reports urinary urgency, frequency (every hour) with nocturia.  DM -- reports home FBS ~ 120, no medication as ordered.  Denies burning with voiding, hesitancy, hematuria, f/c or back pain.  Denies pelvic pain or pressure.  Followed by [facility] PCP and Sonam Jaquez NP (Endo).    HEMOGLOBIN A1C     Ref Range & Units 4 mo ago   Hemoglobin A1C 4.2 - 5.8 % 8.2 High     EAG (mg/dl)  187.06        Review of Systems   Constitutional:  Positive for fatigue. Negative for chills and fever.   Respiratory: Negative.     Cardiovascular: Negative.    Endocrine: Positive for polyuria. Negative for polydipsia and polyphagia.   Genitourinary:  Positive for frequency and urgency. Negative for dysuria, flank pain, hematuria and pelvic pain.   Neurological: Negative.        Review of patient's allergies indicates:   Allergen Reactions    Metformin Itching and Rash       Patient Active Problem List   Diagnosis    HTN (hypertension)    Obesity, Class I, BMI 30-34.9    Uncontrolled type 2 diabetes mellitus without complication, without long-term current use of insulin         Current Outpatient Medications:     aspirin (ECOTRIN) 81 MG EC tablet, Take 81 mg by mouth., Disp: , Rfl:     atorvastatin (LIPITOR) 40 MG tablet, Take 40 mg by mouth every evening., Disp: , Rfl:     blood pressure monitor Kit, 1 kit by Misc.(Non-Drug; Combo Route) route as directed., Disp: 1 each, Rfl: 0    ciclopirox (PENLAC) 8 % Soln, Apply to affected toenails at night time DAILY. On 7th day, file nails down, clean all nails with alcohol and restart application process., Disp: 6.6 mL, Rfl: 11    doxycycline (MONODOX) 100 MG capsule, TAKE 1 CAPSULE BY MOUTH EVERY DAY WITH FOOD. MAY CAUSE UPSET STOMACH., Disp: 30 capsule, Rfl: 2    glimepiride (AMARYL) 4 MG tablet, Take 4 mg by mouth 2 (two) times daily., Disp: , Rfl:      "hydroquinone 4 % Crea, Apply to dark spots once daily. Use with sunscreen if outdoors, Disp: 28 g, Rfl: 1    ivermectin (SOOLANTRA) 1 % Crea, APPLY TO AFFECTED AREA OF FACE EVERY NIGHT AT BEDTIME AS DIRECTED **EXTERNAL USE ONLY**, Disp: 45 g, Rfl: 3    losartan (COZAAR) 12.5 MG tablet, Take 25 mg by mouth once daily., Disp: , Rfl:     losartan-hydrochlorothiazide 100-12.5 mg (HYZAAR) 100-12.5 mg Tab, Take 1 tablet by mouth once daily., Disp: , Rfl:     multivitamin (THERAGRAN) per tablet, Take 1 tablet by mouth once daily., Disp: , Rfl:     omeprazole (PRILOSEC) 40 MG capsule, Take 40 mg by mouth once daily., Disp: , Rfl:     semaglutide (OZEMPIC) 1 mg/dose (2 mg/1.5 mL) PnIj, Inject into the skin every 7 days., Disp: , Rfl:       Past medical, surgical, family and social histories have been reviewed today.      Objective:     Vitals:    10/25/22 1523   BP: 120/88   Pulse: 76   Temp: 98 °F (36.7 °C)   SpO2: 98%   Weight: 85.2 kg (187 lb 15.1 oz)   Height: 5' 5" (1.651 m)   PainSc: 0-No pain       Physical Exam  Vitals reviewed.   Constitutional:       General: She is not in acute distress.  Abdominal:      General: Bowel sounds are normal.      Tenderness: There is no abdominal tenderness. There is no right CVA tenderness, left CVA tenderness or guarding.   Neurological:      Mental Status: She is alert and oriented to person, place, and time.       Results for orders placed or performed in visit on 10/25/22   POCT urine dipstick without microscope   Result Value Ref Range    Color, UA Colorless     pH, UA 5     WBC, UA 0     Nitrite, UA 0     Protein, POC 0     Glucose, UA +++     Ketones, UA 0     Urobilinogen, UA 0     Bilirubin, POC 0     Blood, UA Trace     Clarity, UA Clear     Spec Grav UA 1.005        Diagnosis/Assessment:     1. Uncontrolled type 2 diabetes mellitus with hyperglycemia  2. Glycosuria  3. Urinary frequency --- due to # 1, no infection  - POCT urine dipstick without microscope     Follow-up: "     Follow-up with PCP or Endocrinology for further evaluation.  RTC as directed or on prn basis.        LEO Mariano  [facility]       40 minutes of total time spent on the encounter, which includes face to face time and non-face to face time preparing to see the patient (eg, review of tests), obtaining and/or reviewing separately obtained history, and documenting clinical information in the electronic or other health record.  Also includes independent interpretation of results (not separately reported) and communicating results to the patient/family/caregiver, with care coordination (not separately reported).

## 2022-10-25 ENCOUNTER — OFFICE VISIT (OUTPATIENT)
Dept: FAMILY MEDICINE | Facility: CLINIC | Age: 52
End: 2022-10-25
Payer: COMMERCIAL

## 2022-10-25 VITALS
SYSTOLIC BLOOD PRESSURE: 120 MMHG | OXYGEN SATURATION: 98 % | HEART RATE: 76 BPM | BODY MASS INDEX: 31.31 KG/M2 | WEIGHT: 187.94 LBS | HEIGHT: 65 IN | TEMPERATURE: 98 F | DIASTOLIC BLOOD PRESSURE: 88 MMHG

## 2022-10-25 DIAGNOSIS — E11.65 UNCONTROLLED TYPE 2 DIABETES MELLITUS WITH HYPERGLYCEMIA: Primary | ICD-10-CM

## 2022-10-25 DIAGNOSIS — R35.0 URINARY FREQUENCY: ICD-10-CM

## 2022-10-25 DIAGNOSIS — R81 GLYCOSURIA: ICD-10-CM

## 2022-10-25 LAB
BILIRUB SERPL-MCNC: 0 MG/DL
BLOOD URINE, POC: NORMAL
CLARITY, POC UA: CLEAR
COLOR, POC UA: COLORLESS
GLUCOSE UR QL STRIP: NORMAL
KETONES UR QL STRIP: 0
LEUKOCYTE ESTERASE URINE, POC: 0
NITRITE, POC UA: 0
PH, POC UA: 5
PROTEIN, POC: 0
SPECIFIC GRAVITY, POC UA: 1
UROBILINOGEN, POC UA: 0

## 2022-10-25 PROCEDURE — 4010F ACE/ARB THERAPY RXD/TAKEN: CPT | Mod: CPTII,S$GLB,, | Performed by: REGISTERED NURSE

## 2022-10-25 PROCEDURE — 3074F SYST BP LT 130 MM HG: CPT | Mod: CPTII,S$GLB,, | Performed by: REGISTERED NURSE

## 2022-10-25 PROCEDURE — 99999 PR PBB SHADOW E&M-EST. PATIENT-LVL IV: CPT | Mod: PBBFAC,,, | Performed by: REGISTERED NURSE

## 2022-10-25 PROCEDURE — 81002 URINALYSIS NONAUTO W/O SCOPE: CPT | Mod: S$GLB,,, | Performed by: REGISTERED NURSE

## 2022-10-25 PROCEDURE — 99215 PR OFFICE/OUTPT VISIT, EST, LEVL V, 40-54 MIN: ICD-10-PCS | Mod: 25,S$GLB,, | Performed by: REGISTERED NURSE

## 2022-10-25 PROCEDURE — 4010F PR ACE/ARB THEARPY RXD/TAKEN: ICD-10-PCS | Mod: CPTII,S$GLB,, | Performed by: REGISTERED NURSE

## 2022-10-25 PROCEDURE — 3079F DIAST BP 80-89 MM HG: CPT | Mod: CPTII,S$GLB,, | Performed by: REGISTERED NURSE

## 2022-10-25 PROCEDURE — 3008F PR BODY MASS INDEX (BMI) DOCUMENTED: ICD-10-PCS | Mod: CPTII,S$GLB,, | Performed by: REGISTERED NURSE

## 2022-10-25 PROCEDURE — 3008F BODY MASS INDEX DOCD: CPT | Mod: CPTII,S$GLB,, | Performed by: REGISTERED NURSE

## 2022-10-25 PROCEDURE — 3074F PR MOST RECENT SYSTOLIC BLOOD PRESSURE < 130 MM HG: ICD-10-PCS | Mod: CPTII,S$GLB,, | Performed by: REGISTERED NURSE

## 2022-10-25 PROCEDURE — 1159F PR MEDICATION LIST DOCUMENTED IN MEDICAL RECORD: ICD-10-PCS | Mod: CPTII,S$GLB,, | Performed by: REGISTERED NURSE

## 2022-10-25 PROCEDURE — 81002 POCT URINE DIPSTICK WITHOUT MICROSCOPE: ICD-10-PCS | Mod: S$GLB,,, | Performed by: REGISTERED NURSE

## 2022-10-25 PROCEDURE — 99215 OFFICE O/P EST HI 40 MIN: CPT | Mod: 25,S$GLB,, | Performed by: REGISTERED NURSE

## 2022-10-25 PROCEDURE — 1159F MED LIST DOCD IN RCRD: CPT | Mod: CPTII,S$GLB,, | Performed by: REGISTERED NURSE

## 2022-10-25 PROCEDURE — 99999 PR PBB SHADOW E&M-EST. PATIENT-LVL IV: ICD-10-PCS | Mod: PBBFAC,,, | Performed by: REGISTERED NURSE

## 2022-10-25 PROCEDURE — 3079F PR MOST RECENT DIASTOLIC BLOOD PRESSURE 80-89 MM HG: ICD-10-PCS | Mod: CPTII,S$GLB,, | Performed by: REGISTERED NURSE

## 2022-10-25 RX ORDER — ASPIRIN 81 MG/1
81 TABLET ORAL
COMMUNITY

## 2022-10-25 RX ORDER — ATORVASTATIN CALCIUM 40 MG/1
40 TABLET, FILM COATED ORAL NIGHTLY
COMMUNITY
Start: 2022-05-26

## 2022-10-25 RX ORDER — LOSARTAN POTASSIUM AND HYDROCHLOROTHIAZIDE 12.5; 1 MG/1; MG/1
1 TABLET ORAL DAILY
COMMUNITY
Start: 2022-08-31

## 2022-10-31 LAB — FUNGUS BLD CULT: ABNORMAL

## 2022-11-04 PROBLEM — E11.65 UNCONTROLLED TYPE 2 DIABETES MELLITUS WITH HYPERGLYCEMIA: Status: ACTIVE | Noted: 2017-03-06

## 2023-04-20 ENCOUNTER — OFFICE VISIT (OUTPATIENT)
Dept: DERMATOLOGY | Facility: CLINIC | Age: 53
End: 2023-04-20
Payer: COMMERCIAL

## 2023-04-20 DIAGNOSIS — Z51.81 ENCOUNTER FOR MEDICATION MONITORING: Primary | ICD-10-CM

## 2023-04-20 DIAGNOSIS — L70.0 ACNE VULGARIS: ICD-10-CM

## 2023-04-20 PROCEDURE — 1159F PR MEDICATION LIST DOCUMENTED IN MEDICAL RECORD: ICD-10-PCS | Mod: CPTII,S$GLB,, | Performed by: STUDENT IN AN ORGANIZED HEALTH CARE EDUCATION/TRAINING PROGRAM

## 2023-04-20 PROCEDURE — 1159F MED LIST DOCD IN RCRD: CPT | Mod: CPTII,S$GLB,, | Performed by: STUDENT IN AN ORGANIZED HEALTH CARE EDUCATION/TRAINING PROGRAM

## 2023-04-20 PROCEDURE — 99214 OFFICE O/P EST MOD 30 MIN: CPT | Mod: S$GLB,,, | Performed by: STUDENT IN AN ORGANIZED HEALTH CARE EDUCATION/TRAINING PROGRAM

## 2023-04-20 PROCEDURE — 99999 PR PBB SHADOW E&M-EST. PATIENT-LVL IV: ICD-10-PCS | Mod: PBBFAC,,, | Performed by: STUDENT IN AN ORGANIZED HEALTH CARE EDUCATION/TRAINING PROGRAM

## 2023-04-20 PROCEDURE — 1160F PR REVIEW ALL MEDS BY PRESCRIBER/CLIN PHARMACIST DOCUMENTED: ICD-10-PCS | Mod: CPTII,S$GLB,, | Performed by: STUDENT IN AN ORGANIZED HEALTH CARE EDUCATION/TRAINING PROGRAM

## 2023-04-20 PROCEDURE — 1160F RVW MEDS BY RX/DR IN RCRD: CPT | Mod: CPTII,S$GLB,, | Performed by: STUDENT IN AN ORGANIZED HEALTH CARE EDUCATION/TRAINING PROGRAM

## 2023-04-20 PROCEDURE — 99214 PR OFFICE/OUTPT VISIT, EST, LEVL IV, 30-39 MIN: ICD-10-PCS | Mod: S$GLB,,, | Performed by: STUDENT IN AN ORGANIZED HEALTH CARE EDUCATION/TRAINING PROGRAM

## 2023-04-20 PROCEDURE — 99999 PR PBB SHADOW E&M-EST. PATIENT-LVL IV: CPT | Mod: PBBFAC,,, | Performed by: STUDENT IN AN ORGANIZED HEALTH CARE EDUCATION/TRAINING PROGRAM

## 2023-04-20 NOTE — PROGRESS NOTES
Patient Information  Name: Mildred Meier  : 1970  MRN: [id number]     Referring Physician:  Dr. Fong ref. provider found   Primary Care Physician:  Dr. Inga Mitchell MD   Date of Visit: 2023      Subjective:       Mildred Meier is a 52 y.o. female who presents for   Chief Complaint   Patient presents with    Acne     Breakouts.x months. Tx ivermectin cream and hydroquinone, doxy       HPI  Patient with new complaint of lesion(s)  Location: face  Duration: years  Symptoms: painful cysts, discoloration  Relieving factors/Previous treatments: topical ivermectin, hydroquinone, oral doxycycline without improvement    Patient denies hx of GI problems, anxiety or depression. She reports has had a hysterectomy.    Patient was last seen:Visit date not found     Prior notes by myself reviewed.   Clinical documentation obtained by nursing staff reviewed.    Review of Systems   Skin:  Negative for itching and rash.      Objective:    Physical Exam   Constitutional: She appears well-developed and well-nourished. No distress.   Neurological: She is alert and oriented to person, place, and time. She is not disoriented.   Psychiatric: She has a normal mood and affect.   Skin:   Areas Examined (abnormalities noted in diagram):   Head / Face Inspection Performed  Neck Inspection Performed            Diagram Legend     Erythematous scaling macule/papule c/w actinic keratosis       Vascular papule c/w angioma      Pigmented verrucoid papule/plaque c/w seborrheic keratosis      Yellow umbilicated papule c/w sebaceous hyperplasia      Irregularly shaped tan macule c/w lentigo     1-2 mm smooth white papules consistent with Milia      Movable subcutaneous cyst with punctum c/w epidermal inclusion cyst      Subcutaneous movable cyst c/w pilar cyst      Firm pink to brown papule c/w dermatofibroma      Pedunculated fleshy papule(s) c/w skin tag(s)      Evenly pigmented macule c/w junctional nevus     Mildly variegated  pigmented, slightly irregular-bordered macule c/w mildly atypical nevus      Flesh colored to evenly pigmented papule c/w intradermal nevus       Pink pearly papule/plaque c/w basal cell carcinoma      Erythematous hyperkeratotic cursted plaque c/w SCC      Surgical scar with no sign of skin cancer recurrence      Open and closed comedones      Inflammatory papules and pustules      Verrucoid papule consistent consistent with wart     Erythematous eczematous patches and plaques     Dystrophic onycholytic nail with subungual debris c/w onychomycosis     Umbilicated papule    Erythematous-base heme-crusted tan verrucoid plaque consistent with inflamed seborrheic keratosis     Erythematous Silvery Scaling Plaque c/w Psoriasis     See annotation      No images are attached to the encounter or orders placed in the encounter.    [] Data reviewed  [] Independent review of test  [] Management discussed with another provider    Assessment / Plan:        Encounter for medication monitoring  -     CBC Auto Differential; Future; Expected date: 04/20/2023  -     Comprehensive Metabolic Panel; Future; Expected date: 04/20/2023  -     Lipid Panel; Future; Expected date: 04/20/2023    Acne vulgaris  Discussed risks and benefits of Isotretinoin including but not limited to dry eyes, dry skin, and dry lips; headaches; nosebleeds; muscle aches; joint aches; elevated liver functions; elevated cholesterol or triglycerides; depression; diarrhea/stomach cramping (inflammatory bowel disease); increased sun sensitivity. No laser while on Isotretinoin or for one month after completion of Isotretinoin course. No waxing and no donating blood while on Isotretinoin or for one month after completion of Isotretinoin course. No sharing the Isotretinoin with other people.     Consents signed and enter patient into [employer] program.  Will check baseline lipid panel, liver function tests.   If labs normal, will start Isotretinoin at 40 mg po daily.                 LOS NUMBER AND COMPLEXITY OF PROBLEMS    COMPLEXITY OF DATA RISK TOTAL TIME (m)   87131  [id number] [] 1 self-limited or minor problem [] Minimal to none [] No treatment recommended or patient to monitor 15-29  10-19   26581  92480 Low  [] 2 or > self limited or minor problems  [] 1 stable chronic illness  [] 1 acute, uncomplicated illness or injury Limited (2)  [] Prior external notes from each unique source  [] Review result of each unique test  [] Order each unique test []  Low  OTC medications, minor skin biopsy 30-44  20-29   90827  51606 Moderate  [x]  1 or > chronic illness with progression, exacerbation or SE of treatment  []  2 or more stable chronic illnesses  []  1 acute illness with systemic symptoms  []  1 acute complicated injury  []  1 undiagnosed new problem with uncertain prognosis Moderate (1/3 below)  [x]  3 or more data items        *Now includes assessment requiring independent historian  []  Independent interpretation of a test  []  Discuss management/test with another provider Moderate  [x]  Prescription drug mgmt  []  Minor surgery with risk discussed  []  Mgmt limited by social determinates 45-59  30-39   02271  66196 High  []  1 or more chronic illness with severe exacerbation, progression or SE of treatment  []  1 acute or chronic illness/injury that poses a threat to life or bodily function Extensive (2/3 below)  []  3 or more data items        *Now includes assessment requiring independent historian.  []  Independent interpretation of a test  []  Discuss management/test with another provider High  []  Major surgery with risk discussed  []  Drug therapy requiring intensive monitoring for toxicity  []  Hospitalization  []  Decision for DNR 60-74  40-54      No follow-ups on file.    Donya Lopez MD, FAAD  [facility]

## 2023-04-21 ENCOUNTER — LAB VISIT (OUTPATIENT)
Dept: LAB | Facility: HOSPITAL | Age: 53
End: 2023-04-21
Attending: STUDENT IN AN ORGANIZED HEALTH CARE EDUCATION/TRAINING PROGRAM
Payer: COMMERCIAL

## 2023-04-21 DIAGNOSIS — Z51.81 ENCOUNTER FOR MEDICATION MONITORING: ICD-10-CM

## 2023-04-21 LAB
ALBUMIN SERPL BCP-MCNC: 3.8 G/DL (ref 3.5–5.2)
ALP SERPL-CCNC: 89 U/L (ref 55–135)
ALT SERPL W/O P-5'-P-CCNC: 20 U/L (ref 10–44)
ANION GAP SERPL CALC-SCNC: 11 MMOL/L (ref 8–16)
AST SERPL-CCNC: 13 U/L (ref 10–40)
BASOPHILS # BLD AUTO: 0.04 K/UL (ref 0–0.2)
BASOPHILS NFR BLD: 0.6 % (ref 0–1.9)
BILIRUB SERPL-MCNC: 0.5 MG/DL (ref 0.1–1)
BUN SERPL-MCNC: 15 MG/DL (ref 6–20)
CALCIUM SERPL-MCNC: 9.2 MG/DL (ref 8.7–10.5)
CHLORIDE SERPL-SCNC: 105 MMOL/L (ref 95–110)
CHOLEST SERPL-MCNC: 203 MG/DL (ref 120–199)
CHOLEST/HDLC SERPL: 5 {RATIO} (ref 2–5)
CO2 SERPL-SCNC: 24 MMOL/L (ref 23–29)
CREAT SERPL-MCNC: 1.3 MG/DL (ref 0.5–1.4)
DIFFERENTIAL METHOD: NORMAL
EOSINOPHIL # BLD AUTO: 0.2 K/UL (ref 0–0.5)
EOSINOPHIL NFR BLD: 2.7 % (ref 0–8)
ERYTHROCYTE [DISTWIDTH] IN BLOOD BY AUTOMATED COUNT: 14 % (ref 11.5–14.5)
EST. GFR  (NO RACE VARIABLE): 49.5 ML/MIN/1.73 M^2
GLUCOSE SERPL-MCNC: 288 MG/DL (ref 70–110)
HCT VFR BLD AUTO: 41.9 % (ref 37–48.5)
HDLC SERPL-MCNC: 41 MG/DL (ref 40–75)
HDLC SERPL: 20.2 % (ref 20–50)
HGB BLD-MCNC: 13.6 G/DL (ref 12–16)
IMM GRANULOCYTES # BLD AUTO: 0.01 K/UL (ref 0–0.04)
IMM GRANULOCYTES NFR BLD AUTO: 0.2 % (ref 0–0.5)
LDLC SERPL CALC-MCNC: 127 MG/DL (ref 63–159)
LYMPHOCYTES # BLD AUTO: 1.6 K/UL (ref 1–4.8)
LYMPHOCYTES NFR BLD: 26.1 % (ref 18–48)
MCH RBC QN AUTO: 28.6 PG (ref 27–31)
MCHC RBC AUTO-ENTMCNC: 32.5 G/DL (ref 32–36)
MCV RBC AUTO: 88 FL (ref 82–98)
MONOCYTES # BLD AUTO: 0.5 K/UL (ref 0.3–1)
MONOCYTES NFR BLD: 7.6 % (ref 4–15)
NEUTROPHILS # BLD AUTO: 4 K/UL (ref 1.8–7.7)
NEUTROPHILS NFR BLD: 62.8 % (ref 38–73)
NONHDLC SERPL-MCNC: 162 MG/DL
NRBC BLD-RTO: 0 /100 WBC
PLATELET # BLD AUTO: 304 K/UL (ref 150–450)
PMV BLD AUTO: 12.2 FL (ref 9.2–12.9)
POTASSIUM SERPL-SCNC: 4.2 MMOL/L (ref 3.5–5.1)
PROT SERPL-MCNC: 7.6 G/DL (ref 6–8.4)
RBC # BLD AUTO: 4.75 M/UL (ref 4–5.4)
SODIUM SERPL-SCNC: 140 MMOL/L (ref 136–145)
TRIGL SERPL-MCNC: 175 MG/DL (ref 30–150)
WBC # BLD AUTO: 6.29 K/UL (ref 3.9–12.7)

## 2023-04-21 PROCEDURE — 36415 COLL VENOUS BLD VENIPUNCTURE: CPT | Performed by: STUDENT IN AN ORGANIZED HEALTH CARE EDUCATION/TRAINING PROGRAM

## 2023-04-21 PROCEDURE — 85025 COMPLETE CBC W/AUTO DIFF WBC: CPT | Performed by: STUDENT IN AN ORGANIZED HEALTH CARE EDUCATION/TRAINING PROGRAM

## 2023-04-21 PROCEDURE — 80061 LIPID PANEL: CPT | Performed by: STUDENT IN AN ORGANIZED HEALTH CARE EDUCATION/TRAINING PROGRAM

## 2023-04-21 PROCEDURE — 80053 COMPREHEN METABOLIC PANEL: CPT | Performed by: STUDENT IN AN ORGANIZED HEALTH CARE EDUCATION/TRAINING PROGRAM

## 2023-04-25 DIAGNOSIS — L70.0 ACNE VULGARIS: Primary | ICD-10-CM

## 2023-04-25 RX ORDER — ISOTRETINOIN 40 MG/1
40 CAPSULE ORAL DAILY
Qty: 30 CAPSULE | Refills: 0 | Status: SHIPPED | OUTPATIENT
Start: 2023-04-25 | End: 2023-05-26

## 2023-05-10 ENCOUNTER — TELEPHONE (OUTPATIENT)
Dept: DERMATOLOGY | Facility: CLINIC | Age: 53
End: 2023-05-10
Payer: COMMERCIAL

## 2023-05-23 ENCOUNTER — OFFICE VISIT (OUTPATIENT)
Dept: DERMATOLOGY | Facility: CLINIC | Age: 53
End: 2023-05-23
Payer: COMMERCIAL

## 2023-05-23 DIAGNOSIS — L70.0 ACNE VULGARIS: ICD-10-CM

## 2023-05-23 DIAGNOSIS — L81.0 POSTINFLAMMATORY HYPERPIGMENTATION: Primary | ICD-10-CM

## 2023-05-23 PROCEDURE — 99999 PR PBB SHADOW E&M-EST. PATIENT-LVL IV: CPT | Mod: PBBFAC,,, | Performed by: STUDENT IN AN ORGANIZED HEALTH CARE EDUCATION/TRAINING PROGRAM

## 2023-05-23 PROCEDURE — 99214 OFFICE O/P EST MOD 30 MIN: CPT | Mod: S$GLB,,, | Performed by: STUDENT IN AN ORGANIZED HEALTH CARE EDUCATION/TRAINING PROGRAM

## 2023-05-23 PROCEDURE — 1160F RVW MEDS BY RX/DR IN RCRD: CPT | Mod: CPTII,S$GLB,, | Performed by: STUDENT IN AN ORGANIZED HEALTH CARE EDUCATION/TRAINING PROGRAM

## 2023-05-23 PROCEDURE — 99214 PR OFFICE/OUTPT VISIT, EST, LEVL IV, 30-39 MIN: ICD-10-PCS | Mod: S$GLB,,, | Performed by: STUDENT IN AN ORGANIZED HEALTH CARE EDUCATION/TRAINING PROGRAM

## 2023-05-23 PROCEDURE — 1159F MED LIST DOCD IN RCRD: CPT | Mod: CPTII,S$GLB,, | Performed by: STUDENT IN AN ORGANIZED HEALTH CARE EDUCATION/TRAINING PROGRAM

## 2023-05-23 PROCEDURE — 1159F PR MEDICATION LIST DOCUMENTED IN MEDICAL RECORD: ICD-10-PCS | Mod: CPTII,S$GLB,, | Performed by: STUDENT IN AN ORGANIZED HEALTH CARE EDUCATION/TRAINING PROGRAM

## 2023-05-23 PROCEDURE — 99999 PR PBB SHADOW E&M-EST. PATIENT-LVL IV: ICD-10-PCS | Mod: PBBFAC,,, | Performed by: STUDENT IN AN ORGANIZED HEALTH CARE EDUCATION/TRAINING PROGRAM

## 2023-05-23 PROCEDURE — 1160F PR REVIEW ALL MEDS BY PRESCRIBER/CLIN PHARMACIST DOCUMENTED: ICD-10-PCS | Mod: CPTII,S$GLB,, | Performed by: STUDENT IN AN ORGANIZED HEALTH CARE EDUCATION/TRAINING PROGRAM

## 2023-05-23 RX ORDER — FLUOCINOLONE ACETONIDE, HYDROQUINONE, AND TRETINOIN .1; 40; .5 MG/G; MG/G; MG/G
1 CREAM TOPICAL NIGHTLY
Qty: 30 G | Refills: 0 | Status: SHIPPED | OUTPATIENT
Start: 2023-05-23

## 2023-05-23 RX ORDER — SPIRONOLACTONE 25 MG/1
25 TABLET ORAL DAILY
Qty: 30 TABLET | Refills: 5 | Status: SHIPPED | OUTPATIENT
Start: 2023-05-23 | End: 2024-05-22

## 2023-05-23 NOTE — PROGRESS NOTES
Patient Information  Name: Mildred Meire  : 1970  MRN: [id number]     Referring Physician:  Dr. Fong ref. provider found   Primary Care Physician:  Dr. Inga Mitchell MD   Date of Visit: 2023      Subjective:       Mildred Meier is a 52 y.o. female who presents for   Chief Complaint   Patient presents with    Acne     Accutane f/u      HPI  Patient with hx of acne, here for follow up. Has been on accutane with symptoms like sciatica so had to discontinue. Still with acne flares and discoloration. Overall blood pressure is stable.    Patient was last seen:2023     Prior notes by myself reviewed.   Clinical documentation obtained by nursing staff reviewed.    Review of Systems   Skin:  Negative for itching and rash.      Objective:    Physical Exam   Constitutional: She appears well-developed and well-nourished. No distress.   Neurological: She is alert and oriented to person, place, and time. She is not disoriented.   Psychiatric: She has a normal mood and affect.   Skin:   Areas Examined (abnormalities noted in diagram):   Head / Face Inspection Performed  Neck Inspection Performed            Diagram Legend     Erythematous scaling macule/papule c/w actinic keratosis       Vascular papule c/w angioma      Pigmented verrucoid papule/plaque c/w seborrheic keratosis      Yellow umbilicated papule c/w sebaceous hyperplasia      Irregularly shaped tan macule c/w lentigo     1-2 mm smooth white papules consistent with Milia      Movable subcutaneous cyst with punctum c/w epidermal inclusion cyst      Subcutaneous movable cyst c/w pilar cyst      Firm pink to brown papule c/w dermatofibroma      Pedunculated fleshy papule(s) c/w skin tag(s)      Evenly pigmented macule c/w junctional nevus     Mildly variegated pigmented, slightly irregular-bordered macule c/w mildly atypical nevus      Flesh colored to evenly pigmented papule c/w intradermal nevus       Pink pearly papule/plaque c/w basal cell carcinoma       Erythematous hyperkeratotic cursted plaque c/w SCC      Surgical scar with no sign of skin cancer recurrence      Open and closed comedones      Inflammatory papules and pustules      Verrucoid papule consistent consistent with wart     Erythematous eczematous patches and plaques     Dystrophic onycholytic nail with subungual debris c/w onychomycosis     Umbilicated papule    Erythematous-base heme-crusted tan verrucoid plaque consistent with inflamed seborrheic keratosis     Erythematous Silvery Scaling Plaque c/w Psoriasis     See annotation      No images are attached to the encounter or orders placed in the encounter.    [] Data reviewed  [] Independent review of test  [] Management discussed with another provider    Assessment / Plan:        Postinflammatory hyperpigmentation  -     fluocinolone-hydroq.-tretinoin (TRI-ARIANNA) 0.01-4-0.05 % Crea; Apply 1 application topically every evening. Use for 6 weeks. Hold for 4 weeks. Resume for another 6 weeks  Dispense: 30 g; Refill: 0    Acne vulgaris  -     spironolactone (ALDACTONE) 25 MG tablet; Take 1 tablet (25 mg total) by mouth once daily.  Dispense: 30 tablet; Refill: 5  - Recommend obtaining blood pressures daily for 2 weeks when first starting this medication  - Discussed benefits and risks of therapy including but not limited to breakthrough bleeding/menstrual irregularities, breast tenderness/enlargement, and elevated potassium levels which may give symptoms of fatigue, palpitations, dizziness, headaches and nausea. Patient should limit potassium intake - avoid potassium supplements or salt substitutes, limit bananas and citrus fruits. Pregnancy must be avoided while taking spironolactone.             LOS NUMBER AND COMPLEXITY OF PROBLEMS    COMPLEXITY OF DATA RISK TOTAL TIME (m)   86911  35503 [] 1 self-limited or minor problem [x] Minimal to none [] No treatment recommended or patient to monitor 15-29  10-19   25308  34393 Low  [] 2 or > self limited  or minor problems  [] 1 stable chronic illness  [] 1 acute, uncomplicated illness or injury Limited (2)  [] Prior external notes from each unique source  [] Review result of each unique test  [] Order each unique test []  Low  OTC medications, minor skin biopsy 30-44  20-29   54095  88203 Moderate  [x]  1 or > chronic illness with progression, exacerbation or SE of treatment  []  2 or more stable chronic illnesses  []  1 acute illness with systemic symptoms  []  1 acute complicated injury  []  1 undiagnosed new problem with uncertain prognosis Moderate (1/3 below)  []  3 or more data items        *Now includes assessment requiring independent historian  []  Independent interpretation of a test  []  Discuss management/test with another provider Moderate  [x]  Prescription drug mgmt  []  Minor surgery with risk discussed  []  Mgmt limited by social determinates 45-59  30-39   33383  71310 High  []  1 or more chronic illness with severe exacerbation, progression or SE of treatment  []  1 acute or chronic illness/injury that poses a threat to life or bodily function Extensive (2/3 below)  []  3 or more data items        *Now includes assessment requiring independent historian.  []  Independent interpretation of a test  []  Discuss management/test with another provider High  []  Major surgery with risk discussed  []  Drug therapy requiring intensive monitoring for toxicity  []  Hospitalization  []  Decision for DNR 60-74  40-54      No follow-ups on file.    Donya Lopez MD, FAAD  [facility]

## 2023-08-24 ENCOUNTER — OFFICE VISIT (OUTPATIENT)
Dept: DERMATOLOGY | Facility: CLINIC | Age: 53
End: 2023-08-24
Payer: COMMERCIAL

## 2023-08-24 DIAGNOSIS — L81.0 POSTINFLAMMATORY HYPERPIGMENTATION: ICD-10-CM

## 2023-08-24 DIAGNOSIS — L70.0 ACNE VULGARIS: Primary | ICD-10-CM

## 2023-08-24 PROCEDURE — 1159F PR MEDICATION LIST DOCUMENTED IN MEDICAL RECORD: ICD-10-PCS | Mod: CPTII,S$GLB,, | Performed by: STUDENT IN AN ORGANIZED HEALTH CARE EDUCATION/TRAINING PROGRAM

## 2023-08-24 PROCEDURE — 99214 PR OFFICE/OUTPT VISIT, EST, LEVL IV, 30-39 MIN: ICD-10-PCS | Mod: S$GLB,,, | Performed by: STUDENT IN AN ORGANIZED HEALTH CARE EDUCATION/TRAINING PROGRAM

## 2023-08-24 PROCEDURE — 1159F MED LIST DOCD IN RCRD: CPT | Mod: CPTII,S$GLB,, | Performed by: STUDENT IN AN ORGANIZED HEALTH CARE EDUCATION/TRAINING PROGRAM

## 2023-08-24 PROCEDURE — 99999 PR PBB SHADOW E&M-EST. PATIENT-LVL II: CPT | Mod: PBBFAC,,, | Performed by: STUDENT IN AN ORGANIZED HEALTH CARE EDUCATION/TRAINING PROGRAM

## 2023-08-24 PROCEDURE — 1160F PR REVIEW ALL MEDS BY PRESCRIBER/CLIN PHARMACIST DOCUMENTED: ICD-10-PCS | Mod: CPTII,S$GLB,, | Performed by: STUDENT IN AN ORGANIZED HEALTH CARE EDUCATION/TRAINING PROGRAM

## 2023-08-24 PROCEDURE — 1160F RVW MEDS BY RX/DR IN RCRD: CPT | Mod: CPTII,S$GLB,, | Performed by: STUDENT IN AN ORGANIZED HEALTH CARE EDUCATION/TRAINING PROGRAM

## 2023-08-24 PROCEDURE — 99999 PR PBB SHADOW E&M-EST. PATIENT-LVL II: ICD-10-PCS | Mod: PBBFAC,,, | Performed by: STUDENT IN AN ORGANIZED HEALTH CARE EDUCATION/TRAINING PROGRAM

## 2023-08-24 PROCEDURE — 99214 OFFICE O/P EST MOD 30 MIN: CPT | Mod: S$GLB,,, | Performed by: STUDENT IN AN ORGANIZED HEALTH CARE EDUCATION/TRAINING PROGRAM

## 2023-08-24 NOTE — PROGRESS NOTES
Patient Information  Name: Mildred Meier  : 1970  MRN: [id number]     Referring Physician:  Dr. Fong ref. provider found   Primary Care Physician:  Inga Hunt MD   Date of Visit: 2023      Subjective:       Mildred Meier is a 52 y.o. female who presents for   Chief Complaint   Patient presents with    Acne     Acne. Follow up. Improving.        Patient with hx of acne, here for follow up. Currently just using triluma. Unable to tolerate oral spironolactone. Not at goal at this time.    Patient was last seen:2023     Prior notes by myself reviewed.   Clinical documentation obtained by nursing staff reviewed.    Review of Systems   Skin:  Negative for itching and rash.        Objective:    Physical Exam   Constitutional: She appears well-developed and well-nourished. No distress.   Neurological: She is alert and oriented to person, place, and time. She is not disoriented.   Psychiatric: She has a normal mood and affect.   Skin:   Areas Examined (abnormalities noted in diagram):   Head / Face Inspection Performed  Neck Inspection Performed              Diagram Legend     Erythematous scaling macule/papule c/w actinic keratosis       Vascular papule c/w angioma      Pigmented verrucoid papule/plaque c/w seborrheic keratosis      Yellow umbilicated papule c/w sebaceous hyperplasia      Irregularly shaped tan macule c/w lentigo     1-2 mm smooth white papules consistent with Milia      Movable subcutaneous cyst with punctum c/w epidermal inclusion cyst      Subcutaneous movable cyst c/w pilar cyst      Firm pink to brown papule c/w dermatofibroma      Pedunculated fleshy papule(s) c/w skin tag(s)      Evenly pigmented macule c/w junctional nevus     Mildly variegated pigmented, slightly irregular-bordered macule c/w mildly atypical nevus      Flesh colored to evenly pigmented papule c/w intradermal nevus       Pink pearly papule/plaque c/w basal cell carcinoma      Erythematous hyperkeratotic  cursted plaque c/w SCC      Surgical scar with no sign of skin cancer recurrence      Open and closed comedones      Inflammatory papules and pustules      Verrucoid papule consistent consistent with wart     Erythematous eczematous patches and plaques     Dystrophic onycholytic nail with subungual debris c/w onychomycosis     Umbilicated papule    Erythematous-base heme-crusted tan verrucoid plaque consistent with inflamed seborrheic keratosis     Erythematous Silvery Scaling Plaque c/w Psoriasis     See annotation      No images are attached to the encounter or orders placed in the encounter.    [] Data reviewed  [] Independent review of test  [] Management discussed with another provider    Assessment / Plan:        Acne vulgaris  - Rx topical spironolactone/tretinoin via [facility]    Postinflammatory hyperpigmentation  - Sunscreen SPF 30+  - Continue triluma as instructed           LOS NUMBER AND COMPLEXITY OF PROBLEMS    COMPLEXITY OF DATA RISK TOTAL TIME (m)   42262  66505 [] 1 self-limited or minor problem [x] Minimal to none [] No treatment recommended or patient to monitor 15-29  10-19   75111  01804 Low  [] 2 or > self limited or minor problems  [] 1 stable chronic illness  [] 1 acute, uncomplicated illness or injury Limited (2)  [] Prior external notes from each unique source  [] Review result of each unique test  [] Order each unique test []  Low  OTC medications, minor skin biopsy 30-44 20-29   35319  30920 Moderate  [x]  1 or > chronic illness with progression, exacerbation or SE of treatment  []  2 or more stable chronic illnesses  []  1 acute illness with systemic symptoms  []  1 acute complicated injury  []  1 undiagnosed new problem with uncertain prognosis Moderate (1/3 below)  []  3 or more data items        *Now includes assessment requiring independent historian  []  Independent interpretation of a test  []  Discuss management/test with another provider Moderate  [x]  Prescription drug  mgmt  []  Minor surgery with risk discussed  []  Mgmt limited by social determinates 45-59  30-39   54128  59151 High  []  1 or more chronic illness with severe exacerbation, progression or SE of treatment  []  1 acute or chronic illness/injury that poses a threat to life or bodily function Extensive (2/3 below)  []  3 or more data items        *Now includes assessment requiring independent historian.  []  Independent interpretation of a test  []  Discuss management/test with another provider High  []  Major surgery with risk discussed  []  Drug therapy requiring intensive monitoring for toxicity  []  Hospitalization  []  Decision for DNR 60-74  40-54      No follow-ups on file.    Donya Lopez MD, FAAD  [facility]

## 2023-11-17 ENCOUNTER — PATIENT MESSAGE (OUTPATIENT)
Dept: ENDOCRINOLOGY | Facility: CLINIC | Age: 53
End: 2023-11-17
Payer: COMMERCIAL

## 2024-01-03 ENCOUNTER — OFFICE VISIT (OUTPATIENT)
Dept: DERMATOLOGY | Facility: CLINIC | Age: 54
End: 2024-01-03
Payer: COMMERCIAL

## 2024-01-03 DIAGNOSIS — L70.0 ACNE VULGARIS: Primary | ICD-10-CM

## 2024-01-03 PROCEDURE — 1160F RVW MEDS BY RX/DR IN RCRD: CPT | Mod: CPTII,95,, | Performed by: STUDENT IN AN ORGANIZED HEALTH CARE EDUCATION/TRAINING PROGRAM

## 2024-01-03 PROCEDURE — 99213 OFFICE O/P EST LOW 20 MIN: CPT | Mod: 95,,, | Performed by: STUDENT IN AN ORGANIZED HEALTH CARE EDUCATION/TRAINING PROGRAM

## 2024-01-03 PROCEDURE — 1159F MED LIST DOCD IN RCRD: CPT | Mod: CPTII,95,, | Performed by: STUDENT IN AN ORGANIZED HEALTH CARE EDUCATION/TRAINING PROGRAM

## 2024-01-03 NOTE — PROGRESS NOTES
Patient Information  Name: Mildred Meier  : 1970  MRN: [id number]     Referring Physician:  Dr. Fong ref. provider found   Primary Care Physician:  Inga Hunt MD   Date of Visit: 2024      Subjective:       Mildred Meier is a 53 y.o. female who presents for acne      Patient with hx of acne, here for follow up. Currently on topical spironolactone/tretinoin cream with improvement of acne.    Patient was last seen:2023     Prior notes by myself reviewed.   Clinical documentation obtained by nursing staff reviewed.    Review of Systems   Skin:  Negative for itching and rash.        Objective:    Physical Exam   Constitutional: She appears well-developed and well-nourished. No distress.   Neurological: She is alert and oriented to person, place, and time. She is not disoriented.   Psychiatric: She has a normal mood and affect.   Skin:   Areas Examined (abnormalities noted in diagram):   Head / Face Inspection Performed  Neck Inspection Performed              Diagram Legend     Erythematous scaling macule/papule c/w actinic keratosis       Vascular papule c/w angioma      Pigmented verrucoid papule/plaque c/w seborrheic keratosis      Yellow umbilicated papule c/w sebaceous hyperplasia      Irregularly shaped tan macule c/w lentigo     1-2 mm smooth white papules consistent with Milia      Movable subcutaneous cyst with punctum c/w epidermal inclusion cyst      Subcutaneous movable cyst c/w pilar cyst      Firm pink to brown papule c/w dermatofibroma      Pedunculated fleshy papule(s) c/w skin tag(s)      Evenly pigmented macule c/w junctional nevus     Mildly variegated pigmented, slightly irregular-bordered macule c/w mildly atypical nevus      Flesh colored to evenly pigmented papule c/w intradermal nevus       Pink pearly papule/plaque c/w basal cell carcinoma      Erythematous hyperkeratotic cursted plaque c/w SCC      Surgical scar with no sign of skin cancer recurrence      Open and  closed comedones      Inflammatory papules and pustules      Verrucoid papule consistent consistent with wart     Erythematous eczematous patches and plaques     Dystrophic onycholytic nail with subungual debris c/w onychomycosis     Umbilicated papule    Erythematous-base heme-crusted tan verrucoid plaque consistent with inflamed seborrheic keratosis     Erythematous Silvery Scaling Plaque c/w Psoriasis     See annotation      No images are attached to the encounter or orders placed in the encounter.    [] Data reviewed  [] Independent review of test  [] Management discussed with another provider    Assessment / Plan:        Acne vulgaris  - Continue topical spironolactone/tretinoin via [facility]    Postinflammatory hyperpigmentation  - Sunscreen SPF 30+  - Continue triluma as instructed           LOS NUMBER AND COMPLEXITY OF PROBLEMS    COMPLEXITY OF DATA RISK TOTAL TIME (m)   28162  97284 [] 1 self-limited or minor problem [x] Minimal to none [] No treatment recommended or patient to monitor 15-29  10-19   29896  27721 Low  [] 2 or > self limited or minor problems  [x] 1 stable chronic illness  [] 1 acute, uncomplicated illness or injury Limited (2)  [] Prior external notes from each unique source  [] Review result of each unique test  [] Order each unique test []  Low  OTC medications, minor skin biopsy 30-44 20-29   09412  83140 Moderate  []  1 or > chronic illness with progression, exacerbation or SE of treatment  []  2 or more stable chronic illnesses  []  1 acute illness with systemic symptoms  []  1 acute complicated injury  []  1 undiagnosed new problem with uncertain prognosis Moderate (1/3 below)  []  3 or more data items        *Now includes assessment requiring independent historian  []  Independent interpretation of a test  []  Discuss management/test with another provider Moderate  [x]  Prescription drug mgmt  []  Minor surgery with risk discussed  []  Mgmt limited by social determinates  45-59  30-39   22089  63149 High  []  1 or more chronic illness with severe exacerbation, progression or SE of treatment  []  1 acute or chronic illness/injury that poses a threat to life or bodily function Extensive (2/3 below)  []  3 or more data items        *Now includes assessment requiring independent historian.  []  Independent interpretation of a test  []  Discuss management/test with another provider High  []  Major surgery with risk discussed  []  Drug therapy requiring intensive monitoring for toxicity  []  Hospitalization  []  Decision for DNR 60-74  40-54      No follow-ups on file.    Donya Lopez MD, FAAD  [facility]

## 2024-03-07 ENCOUNTER — CLINICAL SUPPORT (OUTPATIENT)
Dept: INTERNAL MEDICINE | Facility: CLINIC | Age: 54
End: 2024-03-07
Payer: COMMERCIAL

## 2024-03-07 VITALS — TEMPERATURE: 97 F

## 2024-03-07 DIAGNOSIS — R51.9 ACUTE NONINTRACTABLE HEADACHE, UNSPECIFIED HEADACHE TYPE: ICD-10-CM

## 2024-03-07 DIAGNOSIS — R05.9 COUGH, UNSPECIFIED TYPE: Primary | ICD-10-CM

## 2024-03-07 DIAGNOSIS — J02.9 SORE THROAT: ICD-10-CM

## 2024-03-07 LAB
CTP QC/QA: YES
FLUAV AG NPH QL: NEGATIVE
FLUBV AG NPH QL: NEGATIVE
S PYO RRNA THROAT QL PROBE: NEGATIVE
SARS-COV-2 RDRP RESP QL NAA+PROBE: NEGATIVE

## 2024-03-07 PROCEDURE — 87635 SARS-COV-2 COVID-19 AMP PRB: CPT | Mod: QW,S$GLB,, | Performed by: NURSE PRACTITIONER

## 2024-03-07 PROCEDURE — 87804 INFLUENZA ASSAY W/OPTIC: CPT | Mod: 59,QW,S$GLB, | Performed by: NURSE PRACTITIONER

## 2024-03-07 PROCEDURE — 99999 PR PBB SHADOW E&M-EST. PATIENT-LVL II: CPT | Mod: PBBFAC,,,

## 2024-03-07 PROCEDURE — 87880 STREP A ASSAY W/OPTIC: CPT | Mod: QW,S$GLB,, | Performed by: NURSE PRACTITIONER

## 2024-04-01 ENCOUNTER — OFFICE VISIT (OUTPATIENT)
Dept: OPHTHALMOLOGY | Facility: CLINIC | Age: 54
End: 2024-04-01
Payer: COMMERCIAL

## 2024-04-01 DIAGNOSIS — E11.9 DIABETES MELLITUS WITHOUT COMPLICATION: Primary | ICD-10-CM

## 2024-04-01 DIAGNOSIS — H52.03 HYPEROPIA OF BOTH EYES: ICD-10-CM

## 2024-04-01 PROCEDURE — 92015 DETERMINE REFRACTIVE STATE: CPT | Mod: S$GLB,,, | Performed by: OPTOMETRIST

## 2024-04-01 PROCEDURE — 92014 COMPRE OPH EXAM EST PT 1/>: CPT | Mod: S$GLB,,, | Performed by: OPTOMETRIST

## 2024-04-01 PROCEDURE — 1159F MED LIST DOCD IN RCRD: CPT | Mod: CPTII,S$GLB,, | Performed by: OPTOMETRIST

## 2024-04-01 PROCEDURE — 3046F HEMOGLOBIN A1C LEVEL >9.0%: CPT | Mod: CPTII,S$GLB,, | Performed by: OPTOMETRIST

## 2024-04-01 PROCEDURE — 99999 PR PBB SHADOW E&M-EST. PATIENT-LVL III: CPT | Mod: PBBFAC,,, | Performed by: OPTOMETRIST

## 2024-04-01 PROCEDURE — 2023F DILAT RTA XM W/O RTNOPTHY: CPT | Mod: CPTII,S$GLB,, | Performed by: OPTOMETRIST

## 2024-04-01 NOTE — PROGRESS NOTES
HPI     Diabetic Eye Exam            Comments: Patient here today for yearly DM eye exam  C/O blurred vision           Comments    Diagnosed with diabetes in 2012  Patient d/c insulin last week  Lab Results       Component                Value               Date                       HGBA1C                   >14.0 (H)           03/14/2024            Vision changes since last eye exam?: Yes at distance  No correction     Any eye pain today: No    Other ocular symptoms: No    Interested in contact lens fitting today? No                      Last edited by Thais Oliva, PCT on 4/1/2024  3:44 PM.            Assessment /Plan     For exam results, see Encounter Report.    Diabetes mellitus without complication  No retinopathy, hyperopic shift noted on exam today. A1C >14.0 Continue strict bp/bs control at this time. RTC 3 months for DFE with refraction. Mrx given PRN.     Hyperopia of both eyes  Eyeglass Final Rx       Eyeglass Final Rx         Sphere Cylinder    Right +3.50 DS    Left +3.50 DS      Type: SVL- Distance    Expiration Date: 4/1/2025              Eyeglass Final Rx #2         Sphere Cylinder    Right +5.00 DS    Left +5.00 DS      Type: SVL- Computer    Expiration Date: 4/1/2025                      RTC in 3 months for DFE with refraction, sooner if changes to vision or worsening symptoms.  Discussed above and answered questions.

## 2024-04-02 ENCOUNTER — TELEPHONE (OUTPATIENT)
Dept: OPHTHALMOLOGY | Facility: CLINIC | Age: 54
End: 2024-04-02
Payer: COMMERCIAL

## 2024-04-02 NOTE — TELEPHONE ENCOUNTER
Called and spoke to patient, advised hyperopic shift noted on exam 4/1 not a side effect of diabetic medication but a result of poor compliance to diabetic medication, stressed importance of strict blood sugar control per Dr Mitchell.     Patient states she will restart taking medication as prescribed by Dr Mitchell, denies any further questions.     Will RTC in 3 months for follow up after re-starting diabetic medication.

## 2024-06-17 DIAGNOSIS — L81.0 POSTINFLAMMATORY HYPERPIGMENTATION: ICD-10-CM

## 2024-06-18 RX ORDER — FLUOCINOLONE ACETONIDE, HYDROQUINONE, AND TRETINOIN .1; 40; .5 MG/G; MG/G; MG/G
1 CREAM TOPICAL NIGHTLY
Qty: 30 G | Refills: 0 | Status: SHIPPED | OUTPATIENT
Start: 2024-06-18

## 2024-07-01 ENCOUNTER — OFFICE VISIT (OUTPATIENT)
Dept: OPHTHALMOLOGY | Facility: CLINIC | Age: 54
End: 2024-07-01
Payer: COMMERCIAL

## 2024-07-01 DIAGNOSIS — H52.7 REFRACTIVE ERROR: ICD-10-CM

## 2024-07-01 DIAGNOSIS — E11.9 DIABETES MELLITUS WITHOUT COMPLICATION: Primary | ICD-10-CM

## 2024-07-01 PROCEDURE — 99999 PR PBB SHADOW E&M-EST. PATIENT-LVL III: CPT | Mod: PBBFAC,,, | Performed by: OPTOMETRIST

## 2024-07-01 NOTE — PROGRESS NOTES
HPI     Follow-up            Comments: Pt reports for 3 month DFE and refraction          Comments    Hemoglobin A1C       Date                     Value               Ref Range             Status                03/14/2024               >14.0 (H)           4.2 - 5.8 %           Final              Vision changes since last eye exam?: Pt states VA is stable and her vision   has improved since her last eye exam. Pt is not currently using any   eyeglasses and or gtts.     Any eye pain today: No.    Other ocular symptoms: No.    Interested in contact lens fitting today? No.                 Last edited by Kimmy Silverman on 7/1/2024  3:44 PM.            Assessment /Plan     For exam results, see Encounter Report.    1. Diabetes mellitus without complication  Stable, recommend good blood pressure control, strict blood glucose control, and good cholesterol control.  Continue close care with Dr. Mitchell regarding diabetes.    2. Refractive error  RTC 6 months for repeat refraction.      RTC in 6 months for DFE with gOCT, sooner if changes to vision or worsening symptoms.  Discussed above and answered questions.

## 2024-08-08 ENCOUNTER — OFFICE VISIT (OUTPATIENT)
Dept: INTERNAL MEDICINE | Facility: CLINIC | Age: 54
End: 2024-08-08
Payer: COMMERCIAL

## 2024-08-08 VITALS
DIASTOLIC BLOOD PRESSURE: 70 MMHG | BODY MASS INDEX: 29.02 KG/M2 | HEIGHT: 65 IN | HEART RATE: 86 BPM | TEMPERATURE: 97 F | SYSTOLIC BLOOD PRESSURE: 124 MMHG | WEIGHT: 174.19 LBS | RESPIRATION RATE: 20 BRPM | OXYGEN SATURATION: 96 %

## 2024-08-08 DIAGNOSIS — Z11.4 SCREENING FOR HIV (HUMAN IMMUNODEFICIENCY VIRUS): ICD-10-CM

## 2024-08-08 DIAGNOSIS — Z79.4 UNCONTROLLED TYPE 2 DIABETES MELLITUS WITH HYPERGLYCEMIA, WITH LONG-TERM CURRENT USE OF INSULIN: ICD-10-CM

## 2024-08-08 DIAGNOSIS — Z12.11 COLON CANCER SCREENING: ICD-10-CM

## 2024-08-08 DIAGNOSIS — E11.65 UNCONTROLLED TYPE 2 DIABETES MELLITUS WITH HYPERGLYCEMIA, WITH LONG-TERM CURRENT USE OF INSULIN: ICD-10-CM

## 2024-08-08 DIAGNOSIS — R51.9 NEW ONSET HEADACHE: ICD-10-CM

## 2024-08-08 DIAGNOSIS — E78.5 HYPERLIPIDEMIA LDL GOAL <70: ICD-10-CM

## 2024-08-08 DIAGNOSIS — I10 HYPERTENSION GOAL BP (BLOOD PRESSURE) < 130/80: Primary | ICD-10-CM

## 2024-08-08 DIAGNOSIS — Z23 IMMUNIZATION DUE: ICD-10-CM

## 2024-08-08 DIAGNOSIS — K21.9 GASTROESOPHAGEAL REFLUX DISEASE, UNSPECIFIED WHETHER ESOPHAGITIS PRESENT: ICD-10-CM

## 2024-08-08 PROCEDURE — 3046F HEMOGLOBIN A1C LEVEL >9.0%: CPT | Mod: CPTII,S$GLB,, | Performed by: INTERNAL MEDICINE

## 2024-08-08 PROCEDURE — 3008F BODY MASS INDEX DOCD: CPT | Mod: CPTII,S$GLB,, | Performed by: INTERNAL MEDICINE

## 2024-08-08 PROCEDURE — 90471 IMMUNIZATION ADMIN: CPT | Mod: S$GLB,,, | Performed by: INTERNAL MEDICINE

## 2024-08-08 PROCEDURE — 3078F DIAST BP <80 MM HG: CPT | Mod: CPTII,S$GLB,, | Performed by: INTERNAL MEDICINE

## 2024-08-08 PROCEDURE — 99999 PR PBB SHADOW E&M-EST. PATIENT-LVL V: CPT | Mod: PBBFAC,,, | Performed by: INTERNAL MEDICINE

## 2024-08-08 PROCEDURE — 90677 PCV20 VACCINE IM: CPT | Mod: S$GLB,,, | Performed by: INTERNAL MEDICINE

## 2024-08-08 PROCEDURE — 99204 OFFICE O/P NEW MOD 45 MIN: CPT | Mod: 25,S$GLB,, | Performed by: INTERNAL MEDICINE

## 2024-08-08 PROCEDURE — 90472 IMMUNIZATION ADMIN EACH ADD: CPT | Mod: S$GLB,,, | Performed by: INTERNAL MEDICINE

## 2024-08-08 PROCEDURE — 3074F SYST BP LT 130 MM HG: CPT | Mod: CPTII,S$GLB,, | Performed by: INTERNAL MEDICINE

## 2024-08-08 PROCEDURE — 1160F RVW MEDS BY RX/DR IN RCRD: CPT | Mod: CPTII,S$GLB,, | Performed by: INTERNAL MEDICINE

## 2024-08-08 PROCEDURE — 1159F MED LIST DOCD IN RCRD: CPT | Mod: CPTII,S$GLB,, | Performed by: INTERNAL MEDICINE

## 2024-08-08 PROCEDURE — 90715 TDAP VACCINE 7 YRS/> IM: CPT | Mod: S$GLB,,, | Performed by: INTERNAL MEDICINE

## 2024-08-08 RX ORDER — INSULIN GLARGINE 300 [IU]/ML
15 INJECTION, SOLUTION SUBCUTANEOUS DAILY
Qty: 6 PEN | Refills: 1 | Status: SHIPPED | OUTPATIENT
Start: 2024-08-08

## 2024-08-08 RX ORDER — INSULIN GLARGINE 300 [IU]/ML
15 INJECTION, SOLUTION SUBCUTANEOUS DAILY
COMMUNITY
End: 2024-08-08 | Stop reason: SDUPTHER

## 2024-08-08 RX ORDER — BUTALBITAL, ACETAMINOPHEN AND CAFFEINE 50; 325; 40 MG/1; MG/1; MG/1
1 TABLET ORAL EVERY 6 HOURS PRN
Qty: 30 TABLET | Refills: 0 | Status: SHIPPED | OUTPATIENT
Start: 2024-08-08

## 2024-08-08 RX ORDER — LOSARTAN POTASSIUM AND HYDROCHLOROTHIAZIDE 12.5; 1 MG/1; MG/1
1 TABLET ORAL
Qty: 90 TABLET | Refills: 1 | Status: SHIPPED | OUTPATIENT
Start: 2024-08-08

## 2024-08-08 RX ORDER — ATORVASTATIN CALCIUM 40 MG/1
40 TABLET, FILM COATED ORAL NIGHTLY
Qty: 90 TABLET | Refills: 1 | Status: SHIPPED | OUTPATIENT
Start: 2024-08-08

## 2024-08-08 RX ORDER — GLIMEPIRIDE 4 MG/1
4 TABLET ORAL
COMMUNITY

## 2024-08-09 ENCOUNTER — LAB VISIT (OUTPATIENT)
Dept: LAB | Facility: HOSPITAL | Age: 54
End: 2024-08-09
Attending: INTERNAL MEDICINE
Payer: COMMERCIAL

## 2024-08-09 ENCOUNTER — LAB VISIT (OUTPATIENT)
Dept: LAB | Facility: HOSPITAL | Age: 54
End: 2024-08-09
Payer: COMMERCIAL

## 2024-08-09 DIAGNOSIS — E11.65 UNCONTROLLED TYPE 2 DIABETES MELLITUS WITH HYPERGLYCEMIA, WITH LONG-TERM CURRENT USE OF INSULIN: ICD-10-CM

## 2024-08-09 DIAGNOSIS — Z79.4 UNCONTROLLED TYPE 2 DIABETES MELLITUS WITH HYPERGLYCEMIA, WITH LONG-TERM CURRENT USE OF INSULIN: ICD-10-CM

## 2024-08-09 DIAGNOSIS — E78.5 HYPERLIPIDEMIA LDL GOAL <70: ICD-10-CM

## 2024-08-09 DIAGNOSIS — Z11.4 SCREENING FOR HIV (HUMAN IMMUNODEFICIENCY VIRUS): ICD-10-CM

## 2024-08-09 DIAGNOSIS — I10 HYPERTENSION GOAL BP (BLOOD PRESSURE) < 130/80: ICD-10-CM

## 2024-08-09 LAB
ALBUMIN SERPL BCP-MCNC: 3.9 G/DL (ref 3.5–5.2)
ALBUMIN/CREAT UR: NORMAL UG/MG (ref 0–30)
ALP SERPL-CCNC: 84 U/L (ref 55–135)
ALT SERPL W/O P-5'-P-CCNC: 21 U/L (ref 10–44)
ANION GAP SERPL CALC-SCNC: 13 MMOL/L (ref 8–16)
AST SERPL-CCNC: 18 U/L (ref 10–40)
BILIRUB SERPL-MCNC: 0.6 MG/DL (ref 0.1–1)
BUN SERPL-MCNC: 18 MG/DL (ref 6–20)
CALCIUM SERPL-MCNC: 10.1 MG/DL (ref 8.7–10.5)
CHLORIDE SERPL-SCNC: 104 MMOL/L (ref 95–110)
CHOLEST SERPL-MCNC: 121 MG/DL (ref 120–199)
CHOLEST/HDLC SERPL: 2.6 {RATIO} (ref 2–5)
CO2 SERPL-SCNC: 27 MMOL/L (ref 23–29)
CREAT SERPL-MCNC: 1.2 MG/DL (ref 0.5–1.4)
CREAT UR-MCNC: 55 MG/DL (ref 15–325)
EST. GFR  (NO RACE VARIABLE): 54.1 ML/MIN/1.73 M^2
ESTIMATED AVG GLUCOSE: 226 MG/DL (ref 68–131)
GLUCOSE SERPL-MCNC: 193 MG/DL (ref 70–110)
HBA1C MFR BLD: 9.5 % (ref 4–5.6)
HDLC SERPL-MCNC: 46 MG/DL (ref 40–75)
HDLC SERPL: 38 % (ref 20–50)
HIV 1+2 AB+HIV1 P24 AG SERPL QL IA: NORMAL
LDLC SERPL CALC-MCNC: 56.6 MG/DL (ref 63–159)
MICROALBUMIN UR DL<=1MG/L-MCNC: <5 UG/ML
NONHDLC SERPL-MCNC: 75 MG/DL
POTASSIUM SERPL-SCNC: 4.4 MMOL/L (ref 3.5–5.1)
PROT SERPL-MCNC: 7.9 G/DL (ref 6–8.4)
SODIUM SERPL-SCNC: 144 MMOL/L (ref 136–145)
TRIGL SERPL-MCNC: 92 MG/DL (ref 30–150)

## 2024-08-09 PROCEDURE — 87389 HIV-1 AG W/HIV-1&-2 AB AG IA: CPT | Performed by: INTERNAL MEDICINE

## 2024-08-09 PROCEDURE — 36415 COLL VENOUS BLD VENIPUNCTURE: CPT | Performed by: INTERNAL MEDICINE

## 2024-08-09 PROCEDURE — 82043 UR ALBUMIN QUANTITATIVE: CPT | Performed by: INTERNAL MEDICINE

## 2024-08-09 PROCEDURE — 83036 HEMOGLOBIN GLYCOSYLATED A1C: CPT | Performed by: INTERNAL MEDICINE

## 2024-08-09 PROCEDURE — 80053 COMPREHEN METABOLIC PANEL: CPT | Performed by: INTERNAL MEDICINE

## 2024-08-09 PROCEDURE — 80061 LIPID PANEL: CPT | Performed by: INTERNAL MEDICINE

## 2024-08-13 ENCOUNTER — HOSPITAL ENCOUNTER (OUTPATIENT)
Dept: PREADMISSION TESTING | Facility: HOSPITAL | Age: 54
Discharge: HOME | End: 2024-08-13
Attending: FAMILY MEDICINE
Payer: COMMERCIAL

## 2024-08-13 ENCOUNTER — OFFICE VISIT (OUTPATIENT)
Dept: DIABETES | Facility: CLINIC | Age: 54
End: 2024-08-13
Payer: COMMERCIAL

## 2024-08-13 VITALS
DIASTOLIC BLOOD PRESSURE: 78 MMHG | HEART RATE: 77 BPM | WEIGHT: 174 LBS | SYSTOLIC BLOOD PRESSURE: 128 MMHG | BODY MASS INDEX: 28.96 KG/M2

## 2024-08-13 DIAGNOSIS — I10 HYPERTENSION GOAL BP (BLOOD PRESSURE) < 130/80: ICD-10-CM

## 2024-08-13 DIAGNOSIS — E66.9 OBESITY, CLASS I, BMI 30-34.9: ICD-10-CM

## 2024-08-13 DIAGNOSIS — E11.65 UNCONTROLLED TYPE 2 DIABETES MELLITUS WITH HYPERGLYCEMIA: Primary | ICD-10-CM

## 2024-08-13 DIAGNOSIS — Z12.11 COLON CANCER SCREENING: ICD-10-CM

## 2024-08-13 PROCEDURE — 3008F BODY MASS INDEX DOCD: CPT | Mod: CPTII,S$GLB,, | Performed by: NURSE PRACTITIONER

## 2024-08-13 PROCEDURE — 3066F NEPHROPATHY DOC TX: CPT | Mod: CPTII,S$GLB,, | Performed by: NURSE PRACTITIONER

## 2024-08-13 PROCEDURE — 99999 PR PBB SHADOW E&M-EST. PATIENT-LVL IV: CPT | Mod: PBBFAC,,, | Performed by: NURSE PRACTITIONER

## 2024-08-13 PROCEDURE — 99214 OFFICE O/P EST MOD 30 MIN: CPT | Mod: S$GLB,,, | Performed by: NURSE PRACTITIONER

## 2024-08-13 PROCEDURE — G2211 COMPLEX E/M VISIT ADD ON: HCPCS | Mod: S$GLB,,, | Performed by: NURSE PRACTITIONER

## 2024-08-13 PROCEDURE — 1160F RVW MEDS BY RX/DR IN RCRD: CPT | Mod: CPTII,S$GLB,, | Performed by: NURSE PRACTITIONER

## 2024-08-13 PROCEDURE — 3074F SYST BP LT 130 MM HG: CPT | Mod: CPTII,S$GLB,, | Performed by: NURSE PRACTITIONER

## 2024-08-13 PROCEDURE — 1159F MED LIST DOCD IN RCRD: CPT | Mod: CPTII,S$GLB,, | Performed by: NURSE PRACTITIONER

## 2024-08-13 PROCEDURE — 3061F NEG MICROALBUMINURIA REV: CPT | Mod: CPTII,S$GLB,, | Performed by: NURSE PRACTITIONER

## 2024-08-13 PROCEDURE — 3078F DIAST BP <80 MM HG: CPT | Mod: CPTII,S$GLB,, | Performed by: NURSE PRACTITIONER

## 2024-08-13 PROCEDURE — 3046F HEMOGLOBIN A1C LEVEL >9.0%: CPT | Mod: CPTII,S$GLB,, | Performed by: NURSE PRACTITIONER

## 2024-08-13 RX ORDER — SEMAGLUTIDE 0.68 MG/ML
0.5 INJECTION, SOLUTION SUBCUTANEOUS
Qty: 3 ML | Refills: 1 | Status: SHIPPED | OUTPATIENT
Start: 2024-08-13

## 2024-08-13 NOTE — PATIENT INSTRUCTIONS
Start Ozempic 0.25 mg once a week x 4 weeks, then increase to 0.5 mg weekly    Continue Toujeo 15 units every morning  Glimepiride 4 mg at breakfast  Jardiance 25 mg at breakfast

## 2024-08-13 NOTE — PROGRESS NOTES
Patient ID: Mildred Meier is a 53 y.o. female.  Patient's current PCP is Liss Hudson DO.   Collaborating Physician: LAUREANO Gonzalez MD    Chief Complaint: Diabetes Mellitus    HPI  Mildred Meier is a 53 y.o. Black or  female presenting for a follow up for diabetes. Previously seen by me at another facility with LOV 8/24/22. Patient has been diagnosed with type 2 diabetes since 2016 .      Pertinent to decision making is/are the following comorbidities:  GERD  HLD  HTN  Complications related to diabetes: none  Recent diabetes related hospitalizations: none  Current issues:Fluctuating blood sugars      CURRENT DM MEDICATIONS:   Diabetes Medications               empagliflozin (JARDIANCE) 25 mg tablet Take 1 tablet by mouth in the morning for 180 days. For diabets    glimepiride (AMARYL) 4 MG tablet Take 4 mg by mouth daily with breakfast.    insulin glargine, TOUJEO, (TOUJEO SOLOSTAR U-300 INSULIN) 300 unit/mL (1.5 mL) InPn pen Inject 15 Units into the skin once daily. In am          Past failed treatment(s) include:   Metformin - rash  Ozempic 1 mg - felt she lost too much weight: down to 153#  Victoza  Januvia    Her blood sugar in the clinic today was: 197 mg/dl    Blood glucose testing is performed sporadically. Patient is testing 0-1 times per day.  Meter/cgm: meter  Any episodes of hypoglycemia? no   Glucose trends: am 170s and pm 189 per patient report      Current diet: No fried foods. Trying to eat healthy. Portion sizes may be the problem. Down 20# since last visit but up roughly 20# since stopping Ozempic  Activity level: exercises at home - not consistent  Occupation: Here          STANDARDS OF CARE  Eye exam: 7/1/24 [place] - no DR  Foot exam: 8/8/24 [place]  Ace/Arb: losartan 100  Statin: atorvastatin 40  ASA: 81  Previous diabetes education: yes      Preferred lab site: [facility]  Preferred visit site: [facility]    Wt Readings from Last 3 Encounters:   08/13/24 1603 78.9 kg (174  "lb)   08/08/24 0823 79 kg (174 lb 2.6 oz)   10/25/22 1523 85.2 kg (187 lb 15.1 oz)     Labs reviewed and are noted below.    Lab Results   Component Value Date    HGBA1C 9.5 (H) 08/09/2024    HGBA1C >14.0 (H) 03/14/2024    HGBA1C 11.1 (H) 08/29/2023     Lab Results   Component Value Date    WBC 6.29 04/21/2023    HGB 13.6 04/21/2023    HCT 41.9 04/21/2023     04/21/2023    CHOL 121 08/09/2024    TRIG 92 08/09/2024    HDL 46 08/09/2024    LDLCALC 56.6 (L) 08/09/2024    ALT 21 08/09/2024    AST 18 08/09/2024     08/09/2024    K 4.4 08/09/2024     08/09/2024    ANIONGAP 13 08/09/2024    CREATININE 1.2 08/09/2024    ESTGFRAFRICA 59 08/25/2021    EGFRNONAA 54.3 (A) 01/03/2017    BUN 18 08/09/2024    CO2 27 08/09/2024    TSH 1.02 08/29/2023     (H) 08/09/2024     Lab Results   Component Value Date    TSH 1.02 08/29/2023    IRON 82 09/18/2012    TIBC 413.0 09/18/2012    FERRITIN 15 (L) 09/18/2012    DGFHQYQV68 >2,000 (H) 06/30/2022    CALCIUM 10.1 08/09/2024     No results found for: "CPEPTIDE"  No results found for: "GLUTAMICACID"  Glucose   Date Value Ref Range Status   08/09/2024 193 (H) 70 - 110 mg/dL Final     Anion Gap   Date Value Ref Range Status   08/09/2024 13 8 - 16 mmol/L Final     eGFR if    Date Value Ref Range Status   01/03/2017 >60.0 >60 mL/min/1.73 m^2 Final     eGFR    Date Value Ref Range Status   08/25/2021 59  Final     eGFR if non    Date Value Ref Range Status   01/03/2017 54.3 (A) >60 mL/min/1.73 m^2 Final     Comment:     Calculation used to obtain the estimated glomerular filtration  rate (eGFR) is the CKD-EPI equation. Since race is unknown   in our information system, the eGFR values for   -American and Non--American patients are given   for each creatinine result.             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     Social Determinants of Health     Financial Resource Strain: Medium Risk (8/29/2023)    [facility], [facility]    Overall Financial Resource Strain (CARDIA)     Difficulty of Paying Living Expenses: Somewhat hard   Food Insecurity: Food Insecurity Present (8/29/2023)    [facility]    Hunger Vital Sign     Worried About Running Out of Food in the Last Year: Sometimes true     Ran Out of Food in the Last Year: Sometimes true   Transportation Needs: No Transportation Needs (8/29/2023)    [facility], [facility]    PRAPARE - Transportation     Lack of Transportation (Medical): No     Lack of Transportation (Non-Medical): No   Physical Activity: Sufficiently Active (8/29/2023)    [facility], [facility] and [facility]    Exercise Vital Sign     Days of Exercise per Week: 5 days     Minutes  of Exercise per Session: 30 min   Stress: No Stress Concern Present (8/29/2023)    [facility], [facility]    [facility] of Occupational Health - Occupational Stress Questionnaire     Feeling of Stress : Not at all   Housing Stability: High Risk (8/29/2023)    [facility], [facility]    Housing Stability Vital Sign     Unable to Pay for Housing in the Last Year: Yes     Number of Places Lived in the Last Year: 1     Unstable Housing in the Last Year: Patient refused     Past Medical History:   Diagnosis Date    Diabetes mellitus, type 2     GERD (gastroesophageal reflux disease)     Hyperlipidemia     Hypertension        Review of Systems   Constitutional:  Negative for malaise/fatigue and weight loss.   Eyes:  Negative for blurred vision and double vision.   Respiratory:  Negative for shortness of breath.    Cardiovascular: Negative.    Gastrointestinal: Negative.    Genitourinary:  Negative for frequency.   Musculoskeletal:  Negative for myalgias.   Neurological: Negative.    Psychiatric/Behavioral: Negative.           Physical Exam  Vitals reviewed.   Constitutional:       General: She is not in acute distress.     Appearance: Normal appearance.   Eyes:      Conjunctiva/sclera: Conjunctivae normal.      Pupils: Pupils are equal, round, and reactive to light.   Neck:      Thyroid: No thyroid mass, thyromegaly or thyroid tenderness.   Cardiovascular:      Rate and Rhythm: Normal rate and regular rhythm.      Pulses: Normal pulses.   Pulmonary:      Effort: Pulmonary effort is normal.   Musculoskeletal:      Cervical back: Neck supple.      Right lower leg: No edema.      Left lower leg:  No edema.   Skin:     General: Skin is warm and dry.   Neurological:      General: No focal deficit present.      Mental Status: She is alert and oriented to person, place, and time.   Psychiatric:         Mood and Affect: Mood normal.             Assessment & Plan    1. Uncontrolled type 2 diabetes mellitus with hyperglycemia - not to goal  -     POCT Glucose, Hand-Held Device  -     semaglutide (OZEMPIC) 0.25 mg or 0.5 mg (2 mg/3 mL) pen injector; Inject 0.5 mg into the skin every 7 days.  Dispense: 3 mL; Refill: 1  Start Ozempic 0.25 mg once a week x 4 weeks, then increase to 0.5 mg weekly  Continue:   Toujeo 15 units every morning  Glimepiride 4 mg at breakfast  Jardiance 25 mg at breakfast    Reinforced diet and exercise  Call if bs < 80    2. Obesity, Class I, BMI 30-34.9 - BMI down to 28.96  Reinforced diet and exercise  Start Ozempic 0.25 mg     3. Hypertension goal BP (blood pressure) < 130/80- at goal on ARB per pcp       - Reviewed with patient:  The basic pathophysiology of Type 2 diabetes  Mechanism of action and action time of medications  Use of home glucose monitor/cgm  Basic diet/carbohydrate counting/avoiding simple sugars/plate method  Risk of complications and preventive measures  When to call for assistance      - Follow up: 6 weeks    Visit today included increased complexity associated with the care of the episodic problem hyperglycemia addressed and managing the longitudinal care of the patient due to the serious and/or complex managed problem(s) t2dm.

## 2024-08-15 ENCOUNTER — TELEPHONE (OUTPATIENT)
Dept: INTERNAL MEDICINE | Facility: CLINIC | Age: 54
End: 2024-08-15
Payer: COMMERCIAL

## 2024-08-15 DIAGNOSIS — Z79.4 UNCONTROLLED TYPE 2 DIABETES MELLITUS WITH HYPERGLYCEMIA, WITH LONG-TERM CURRENT USE OF INSULIN: ICD-10-CM

## 2024-08-15 DIAGNOSIS — E11.65 UNCONTROLLED TYPE 2 DIABETES MELLITUS WITH HYPERGLYCEMIA, WITH LONG-TERM CURRENT USE OF INSULIN: ICD-10-CM

## 2024-08-15 RX ORDER — INSULIN GLARGINE 300 [IU]/ML
18 INJECTION, SOLUTION SUBCUTANEOUS DAILY
Qty: 6 PEN | Refills: 1 | Status: SHIPPED | OUTPATIENT
Start: 2024-08-15 | End: 2024-08-15

## 2024-08-15 RX ORDER — INSULIN GLARGINE 300 [IU]/ML
15 INJECTION, SOLUTION SUBCUTANEOUS DAILY
Qty: 6 PEN | Refills: 1 | Status: SHIPPED | OUTPATIENT
Start: 2024-08-15

## 2024-08-20 ENCOUNTER — PATIENT OUTREACH (OUTPATIENT)
Dept: ADMINISTRATIVE | Facility: HOSPITAL | Age: 54
End: 2024-08-20
Payer: COMMERCIAL

## 2024-08-20 NOTE — PROGRESS NOTES
VBHM Score: 1     Colon Cancer Screening                 Health Maintenance Topic(s) Outreach Outcomes & Actions Taken:    Colorectal Cancer Screening - Outreach Outcomes & Actions Taken  : Pt has a PAT appt scheduled,  8/21/24.       Additional Notes:  Pt has lab appt, 11/08/24 and follow up scheduled, 11/15/24.         Care Management, Digital Medicine, and/or Education Referrals    OPCM Risk Score: 13.7         Next Steps - Referral Actions: No referrals placed.        Additional Notes:

## 2024-08-21 ENCOUNTER — HOSPITAL ENCOUNTER (OUTPATIENT)
Dept: PREADMISSION TESTING | Facility: HOSPITAL | Age: 54
Discharge: HOME | End: 2024-08-21
Attending: INTERNAL MEDICINE
Payer: COMMERCIAL

## 2024-08-21 DIAGNOSIS — Z12.11 COLON CANCER SCREENING: Primary | ICD-10-CM

## 2024-08-21 RX ORDER — POLYETHYLENE GLYCOL 3350, SODIUM SULFATE, POTASSIUM CHLORIDE, MAGNESIUM SULFATE, AND SODIUM CHLORIDE FOR ORAL SOLUTION 178.7-7.3G
1 KIT ORAL DAILY
Qty: 2 EACH | Refills: 0 | Status: SHIPPED | OUTPATIENT
Start: 2024-08-21 | End: 2024-08-25

## 2024-09-03 ENCOUNTER — ANESTHESIA EVENT (OUTPATIENT)
Dept: ENDOSCOPY | Facility: HOSPITAL | Age: 54
End: 2024-09-03
Payer: COMMERCIAL

## 2024-09-03 NOTE — ANESTHESIA PREPROCEDURE EVALUATION
09/03/2024  Mildred Meier is a 53 y.o., female.  Patient Active Problem List   Diagnosis    Hypertension goal BP (blood pressure) < 130/80    Obesity, Class I, BMI 30-34.9    Uncontrolled type 2 diabetes mellitus with hyperglycemia           Pre-op Assessment    I have reviewed the Patient Summary Reports.     I have reviewed the Nursing Notes. I have reviewed the NPO Status.   I have reviewed the Medications.     Review of Systems  Anesthesia Hx:  No problems with previous Anesthesia             Denies Family Hx of Anesthesia complications.    Denies Personal Hx of Anesthesia complications.                    Social:  Non-Smoker, Alcohol Use       Cardiovascular:     Hypertension           hyperlipidemia                             Hepatic/GI:  Bowel Prep.   GERD             Endocrine:  Diabetes, poorly controlled, type 2         Obesity / BMI > 30      Physical Exam  General: Well nourished, Cooperative, Alert and Oriented    Airway:  Mallampati: II   Mouth Opening: Normal  TM Distance: Normal  Tongue: Normal  Neck ROM: Normal ROM    Dental:  Intact        Anesthesia Plan  Type of Anesthesia, risks & benefits discussed:    Anesthesia Type: Gen Natural Airway  Intra-op Monitoring Plan: Standard ASA Monitors  Post Op Pain Control Plan: multimodal analgesia  Induction:  IV  Informed Consent: Informed consent signed with the Patient and all parties understand the risks and agree with anesthesia plan.  All questions answered. Patient consented to blood products? No  ASA Score: 2  Day of Surgery Review of History & Physical: H&P Update referred to the surgeon/provider.    Ready For Surgery From Anesthesia Perspective.     .

## 2024-09-06 ENCOUNTER — ANESTHESIA (OUTPATIENT)
Dept: ENDOSCOPY | Facility: HOSPITAL | Age: 54
End: 2024-09-06
Payer: COMMERCIAL

## 2024-09-06 ENCOUNTER — HOSPITAL ENCOUNTER (OUTPATIENT)
Facility: HOSPITAL | Age: 54
Discharge: HOME | End: 2024-09-06
Attending: INTERNAL MEDICINE | Admitting: INTERNAL MEDICINE
Payer: COMMERCIAL

## 2024-09-06 DIAGNOSIS — Z12.11 COLON CANCER SCREENING: ICD-10-CM

## 2024-09-06 LAB — POCT GLUCOSE: 223 MG/DL (ref 70–110)

## 2024-09-06 PROCEDURE — 63600175 PHARM REV CODE 636 W HCPCS: Performed by: NURSE ANESTHETIST, CERTIFIED REGISTERED

## 2024-09-06 PROCEDURE — 63600175 PHARM REV CODE 636 W HCPCS: Performed by: INTERNAL MEDICINE

## 2024-09-06 PROCEDURE — 37000009 HC ANESTHESIA EA ADD 15 MINS: Performed by: INTERNAL MEDICINE

## 2024-09-06 PROCEDURE — 25000003 PHARM REV CODE 250: Performed by: NURSE ANESTHETIST, CERTIFIED REGISTERED

## 2024-09-06 PROCEDURE — 82962 GLUCOSE BLOOD TEST: CPT | Performed by: INTERNAL MEDICINE

## 2024-09-06 PROCEDURE — G0121 COLON CA SCRN NOT HI RSK IND: HCPCS | Performed by: INTERNAL MEDICINE

## 2024-09-06 PROCEDURE — 37000008 HC ANESTHESIA 1ST 15 MINUTES: Performed by: INTERNAL MEDICINE

## 2024-09-06 PROCEDURE — G0121 COLON CA SCRN NOT HI RSK IND: HCPCS | Mod: ,,, | Performed by: INTERNAL MEDICINE

## 2024-09-06 RX ORDER — LIDOCAINE HYDROCHLORIDE 20 MG/ML
INJECTION, SOLUTION EPIDURAL; INFILTRATION; INTRACAUDAL; PERINEURAL
Status: DISCONTINUED | OUTPATIENT
Start: 2024-09-06 | End: 2024-09-06

## 2024-09-06 RX ORDER — PROPOFOL 10 MG/ML
VIAL (ML) INTRAVENOUS
Status: DISCONTINUED | OUTPATIENT
Start: 2024-09-06 | End: 2024-09-06

## 2024-09-06 RX ORDER — SODIUM CHLORIDE, SODIUM LACTATE, POTASSIUM CHLORIDE, CALCIUM CHLORIDE 600; 310; 30; 20 MG/100ML; MG/100ML; MG/100ML; MG/100ML
INJECTION, SOLUTION INTRAVENOUS CONTINUOUS
Status: DISCONTINUED | OUTPATIENT
Start: 2024-09-06 | End: 2024-09-06 | Stop reason: HOSPADM

## 2024-09-06 RX ADMIN — PROPOFOL 30 MG: 10 INJECTION, EMULSION INTRAVENOUS at 12:09

## 2024-09-06 RX ADMIN — PROPOFOL 20 MG: 10 INJECTION, EMULSION INTRAVENOUS at 12:09

## 2024-09-06 RX ADMIN — LIDOCAINE HYDROCHLORIDE 50 MG: 20 INJECTION, SOLUTION EPIDURAL; INFILTRATION; INTRACAUDAL; PERINEURAL at 12:09

## 2024-09-06 RX ADMIN — PROPOFOL 50 MG: 10 INJECTION, EMULSION INTRAVENOUS at 12:09

## 2024-09-06 RX ADMIN — SODIUM CHLORIDE, POTASSIUM CHLORIDE, SODIUM LACTATE AND CALCIUM CHLORIDE: 600; 310; 30; 20 INJECTION, SOLUTION INTRAVENOUS at 10:09

## 2024-09-06 RX ADMIN — PROPOFOL 100 MG: 10 INJECTION, EMULSION INTRAVENOUS at 12:09

## 2024-09-06 NOTE — PROVATION PATIENT INSTRUCTIONS
Discharge Summary/Instructions after an Endoscopic Procedure  Patient Name: Mildred Meier  Patient MRN: [id number]  Patient YOB: 1970  Friday, September 6, 2024  Phu Villasenor MD  Dear patient,  As a result of recent federal legislation (The Federal Cures Act), you may   receive lab or pathology results from your procedure in your [facility]   account before your physician is able to contact you. Your physician or   their representative will relay the results to you with their   recommendations at their soonest availability.  Thank you,  RESTRICTIONS:  During your procedure today, you received medications for sedation.  These   medications may affect your judgment, balance and coordination.  Therefore,   for 24 hours, you have the following restrictions:   - DO NOT drive a car, operate machinery, make legal/financial decisions,   sign important papers or drink alcohol.    ACTIVITY:  Today: no heavy lifting, straining or running due to procedural   sedation/anesthesia.  The following day: return to full activity including work.  DIET:  Eat and drink normally unless instructed otherwise.     TREATMENT FOR COMMON SIDE EFFECTS:  - Mild abdominal pain, nausea, belching, bloating or excessive gas:  rest,   eat lightly and use a heating pad.  - Sore Throat: treat with throat lozenges and/or gargle with warm salt   water.  - Because air was used during the procedure, expelling large amounts of air   from your rectum or belching is normal.  - If a bowel prep was taken, you may not have a bowel movement for 1-3 days.    This is normal.  SYMPTOMS TO WATCH FOR AND REPORT TO YOUR PHYSICIAN:  1. Abdominal pain or bloating, other than gas cramps.  2. Chest pain.  3. Back pain.  4. Signs of infection such as: chills or fever occurring within 24 hours   after the procedure.  5. Rectal bleeding, which would show as bright red, maroon, or black stools.   (A tablespoon of blood from the rectum is not serious,  especially if   hemorrhoids are present.)  6. Vomiting.  7. Weakness or dizziness.  GO DIRECTLY TO THE NEAREST EMERGENCY ROOM IF YOU HAVE ANY OF THE FOLLOWING:      Difficulty breathing              Chills and/or fever over 101 F   Persistent vomiting and/or vomiting blood   Severe abdominal pain   Severe chest pain   Black, tarry stools   Bleeding- more than one tablespoon   Any other symptom or condition that you feel may need urgent attention  Your doctor recommends these additional instructions:  If any biopsies were taken, your doctors clinic will contact you in 1 to 2   weeks with any results.  - Discharge patient to home.   - Resume previous diet.   - Continue present medications.   - Repeat colonoscopy in 10 years for surveillance.   - Return to referring physician as previously scheduled.   - Patient has a contact number available for emergencies.  The signs and   symptoms of potential delayed complications were discussed with the   patient.  Return to normal activities tomorrow.  Written discharge   instructions were provided to the patient.  For questions, problems or results please call your physician Phu Villasenor MD at Work:  [phone]  If you have any questions about the above instructions, call the GI   department at [phone] or call the endoscopy unit at [phone]   from 7am until 3 pm.  [facility] [place] [place], EMERGENCY ROOM PHONE NUMBER:   [phone]  IF A COMPLICATION OR EMERGENCY SITUATION ARISES AND YOU ARE UNABLE TO REACH   YOUR PHYSICIAN - GO DIRECTLY TO THE EMERGENCY ROOM.  I have read or have had read to me these discharge instructions for my   procedure and have received a written copy.  I understand these   instructions and will follow-up with my physician if I have any questions.     __________________________________       _____________________________________  Nurse Signature                                          Patient/Designated    Responsible Party Signature  MD Phu Vieira MD  9/6/2024 12:54:39 PM  This report has been verified and signed electronically.  Dear patient,  As a result of recent federal legislation (The Federal Cures Act), you may   receive lab or pathology results from your procedure in your [facility]   account before your physician is able to contact you. Your physician or   their representative will relay the results to you with their   recommendations at their soonest availability.  Thank you,  PROVATION

## 2024-09-06 NOTE — ANESTHESIA POSTPROCEDURE EVALUATION
Anesthesia Post Evaluation    Patient: Mildred Meier    Procedure(s) Performed: Procedure(s) (LRB):  COLONOSCOPY (N/A)    Final Anesthesia Type: general      Patient location during evaluation: PACU  Patient participation: Yes- Able to Participate  Level of consciousness: awake  Post-procedure vital signs: reviewed and stable  Pain management: adequate  Airway patency: patent    PONV status at discharge: No PONV  Anesthetic complications: no      Cardiovascular status: stable  Respiratory status: unassisted  Hydration status: euvolemic  Follow-up not needed.              Vitals Value Taken Time   /66 09/06/24 1300   Temp 36.7 °C (98.1 °F) 09/06/24 1255   Pulse 91 09/06/24 1300   Resp 16 09/06/24 1300   SpO2 99 % 09/06/24 1300         No case tracking events are documented in the log.      Pain/Patricia Score: No data recorded

## 2024-09-06 NOTE — TRANSFER OF CARE
"Anesthesia Transfer of Care Note    Patient: Mildred Meier    Procedure(s) Performed: Procedure(s) (LRB):  COLONOSCOPY (N/A)    Patient location: PACU    Anesthesia Type: general    Transport from OR: Transported from OR on room air with adequate spontaneous ventilation    Post pain: adequate analgesia    Post assessment: no apparent anesthetic complications and tolerated procedure well    Post vital signs: stable    Level of consciousness: awake    Nausea/Vomiting: no nausea/vomiting    Complications: none    Transfer of care protocol was followed      Last vitals: Visit Vitals  /73 (BP Location: Right arm, Patient Position: Sitting)   Pulse 97   Temp 36.3 °C (97.4 °F) (Temporal)   Resp 16   Ht 5' 5" (1.651 m)   Wt 76.3 kg (168 lb 3.4 oz)   SpO2 100%   Breastfeeding No   BMI 27.99 kg/m²     "

## 2024-09-06 NOTE — H&P
Endoscopy History and Physical    PCP - Liss Hudson DO  Referring Physician - Liss Hudson DO  [id number] [facility] [place] [id number]      ASA - per anesthesia  Mallampati - per anesthesia  History of Anesthesia problems - no  Family history Anesthesia problems -  no   Plan of anesthesia - General    HPI  53 y.o. female    Planned Procedure: Colonoscopy  Diagnosis: screening for colon cancer  Chief Complaint: Same as above    Personnel H/o colon polyps:index  FH of colon cancer:no  Anticoagulation:no      ROS:  Constitutional: No fevers, chills, No weight loss  CV: No chest pain  Pulm: No cough, No shortness of breath  GI: see HPI    Medical History:  has a past medical history of Diabetes mellitus, type 2, GERD (gastroesophageal reflux disease), Hyperlipidemia, and Hypertension.    Surgical History:  has a past surgical history that includes Hysterectomy (2012) and  section.    Family History: family history includes Cancer in her mother; Colon cancer in her father; Hypertension in her mother; Stroke in her maternal aunt..    Social History:  reports that she has never smoked. She has never used smokeless tobacco. She reports current alcohol use. She reports that she does not use drugs.    Review of patient's allergies indicates:   Allergen Reactions    Ace inhibitors      Cough    Metformin Itching and Rash       Medications:   Medications Prior to Admission   Medication Sig Dispense Refill Last Dose    aspirin (ECOTRIN) 81 MG EC tablet Take 81 mg by mouth.       atorvastatin (LIPITOR) 40 MG tablet Take 1 tablet by mouth daily at bedtime for cholesterol 90 tablet 1     blood pressure monitor Kit 1 kit by Misc.(Non-Drug; Combo Route) route as directed. 1 each 0     butalbital-acetaminophen-caffeine -40 mg (FIORICET, ESGIC) -40 mg per tablet Take 1 tablet by mouth every 6 (six) hours as needed for Headaches. 30 tablet 0     empagliflozin (JARDIANCE) 25 mg tablet Take 1 tablet  "by mouth in the morning for 180 days. For diabets 90 tablet 1     fluocinolone-hydroq.-tretinoin (TRI-ARIANNA) 0.01-4-0.05 % Crea Apply 1 application  topically every evening. Use for 6 weeks. Hold for 4 weeks. Resume for another 6 weeks 30 g 0     glimepiride (AMARYL) 4 MG tablet Take 4 mg by mouth daily with breakfast.       hydroquinone 4 % Crea Apply to dark spots once daily. Use with sunscreen if outdoors 28 g 1     insulin glargine, TOUJEO, (TOUJEO SOLOSTAR U-300 INSULIN) 300 unit/mL (1.5 mL) InPn pen Inject 15 Units into the skin once daily. 6 Pen 1     ivermectin (SOOLANTRA) 1 % Crea APPLY TO AFFECTED AREA OF FACE EVERY NIGHT AT BEDTIME AS DIRECTED **EXTERNAL USE ONLY** 45 g 3     losartan-hydrochlorothiazide 100-12.5 mg (HYZAAR) 100-12.5 mg Tab Take 1 tablet by mouth once daily for blood pressure 90 tablet 1     multivitamin (THERAGRAN) per tablet Take 1 tablet by mouth once daily.       omeprazole (PRILOSEC) 40 MG capsule Take 1 capsule by mouth once daily. For reflux 90 capsule 1     [] peg 3350-sod sulf,chlr-pot-mag (SUFLAVE) 178.7-7.3-0.5 gram SolR Take 1 Bottle by mouth once daily. for 2 days 2 each 0     pen needle, diabetic (PEN NEEDLE) 32 gauge x 5/32" Ndle Use daily with lantus 100 each 5     semaglutide (OZEMPIC) 0.25 mg or 0.5 mg (2 mg/3 mL) pen injector Inject 0.5 mg into the skin every 7 days. 3 mL 1     spironolactone (ALDACTONE) 25 MG tablet Take 1 tablet (25 mg total) by mouth once daily. 30 tablet 5        Physical Exam:    Vital Signs: There were no vitals filed for this visit.    General Appearance: Well appearing in no acute distress  Abdomen: Soft, non tender, non distended with normal bowel sounds, no masses    Labs:  Lab Results   Component Value Date    WBC 6.29 2023    HGB 13.6 2023    HCT 41.9 2023     2023    CHOL 121 2024    TRIG 92 2024    HDL 46 2024    ALT 21 2024    AST 18 2024     2024    K 4.4 " 08/09/2024     08/09/2024    CREATININE 1.2 08/09/2024    BUN 18 08/09/2024    CO2 27 08/09/2024    TSH 1.02 08/29/2023    HGBA1C 9.5 (H) 08/09/2024       I have explained the risks and benefits of this endoscopic procedure to the patient including but not limited to bleeding, inflammation, infection, perforation, and death.    SEDATION PLAN: per anesthesia       History reviewed, vital signs satisfactory, cardiopulmonary status satisfactory, sedation options, risks and plans have been discussed with the patient  All their questions were answered and the patient agrees to the sedation procedures as planned and the patient is deemed an appropriate candidate for the sedation as planned.     The risks, benefits and alternatives of the procedure were discussed with the patient in detail. This discussion was had in the presence of endoscopy staff. The risks include, risks of adverse reaction to sedation requiring the use of reversal agents, bleeding requiring blood transfusion, perforation requiring surgical intervention and technical failure. Other risks include aspiration leading to respiratory distress and respiratory failure resulting in endotracheal intubation and mechanical ventilation including death. If anesthesia is being utilized for this procedure, it is up to the anesthesiologist to determine airway safety including elective endotracheal intubation. Questions were answered, they agree to proceed. There was no language barriers.       Procedure explained to patient, informed consent obtained and placed in chart.       Phu Villasenor MD

## 2024-09-06 NOTE — DISCHARGE SUMMARY
[facility] - Endoscopy 1st Fl  Discharge Note  Short Stay    Procedure(s) (LRB):  COLONOSCOPY (N/A)      OUTCOME: Patient tolerated treatment/procedure well without complication and is now ready for discharge.    DISPOSITION: Home or Self Care    FINAL DIAGNOSIS:  Colon cancer screening    FOLLOWUP: With primary care provider    DISCHARGE INSTRUCTIONS:  No discharge procedures on file.     TIME SPENT ON DISCHARGE: 20 minutes

## 2024-09-06 NOTE — PLAN OF CARE
Discharge instructions reviewed with patient, verbalized understanding. Tolerating liquids. Voiced no complaints. Vital signs stable. Discharging home with lorne.

## 2024-09-09 VITALS
WEIGHT: 168.19 LBS | DIASTOLIC BLOOD PRESSURE: 68 MMHG | BODY MASS INDEX: 28.02 KG/M2 | HEIGHT: 65 IN | TEMPERATURE: 98 F | RESPIRATION RATE: 16 BRPM | OXYGEN SATURATION: 100 % | HEART RATE: 77 BPM | SYSTOLIC BLOOD PRESSURE: 119 MMHG

## 2024-09-18 ENCOUNTER — HOSPITAL ENCOUNTER (OUTPATIENT)
Dept: RADIOLOGY | Facility: HOSPITAL | Age: 54
Discharge: HOME | End: 2024-09-18
Attending: INTERNAL MEDICINE
Payer: COMMERCIAL

## 2024-09-18 VITALS — WEIGHT: 168.19 LBS | HEIGHT: 65 IN | BODY MASS INDEX: 28.02 KG/M2

## 2024-09-18 DIAGNOSIS — Z12.31 SCREENING MAMMOGRAM FOR BREAST CANCER: ICD-10-CM

## 2024-09-18 PROCEDURE — 77067 SCR MAMMO BI INCL CAD: CPT | Mod: 26,,, | Performed by: RADIOLOGY

## 2024-09-18 PROCEDURE — 77063 BREAST TOMOSYNTHESIS BI: CPT | Mod: TC

## 2024-09-18 PROCEDURE — 77063 BREAST TOMOSYNTHESIS BI: CPT | Mod: 26,,, | Performed by: RADIOLOGY

## 2024-09-18 PROCEDURE — 77067 SCR MAMMO BI INCL CAD: CPT | Mod: TC

## 2024-09-25 ENCOUNTER — OFFICE VISIT (OUTPATIENT)
Dept: DIABETES | Facility: CLINIC | Age: 54
End: 2024-09-25
Payer: COMMERCIAL

## 2024-09-25 VITALS
SYSTOLIC BLOOD PRESSURE: 133 MMHG | DIASTOLIC BLOOD PRESSURE: 85 MMHG | BODY MASS INDEX: 28.84 KG/M2 | HEART RATE: 72 BPM | WEIGHT: 173.31 LBS

## 2024-09-25 DIAGNOSIS — E66.9 OBESITY, CLASS I, BMI 30-34.9: ICD-10-CM

## 2024-09-25 DIAGNOSIS — E11.65 UNCONTROLLED TYPE 2 DIABETES MELLITUS WITH HYPERGLYCEMIA: Primary | ICD-10-CM

## 2024-09-25 LAB — GLUCOSE SERPL-MCNC: 172 MG/DL (ref 70–110)

## 2024-09-25 PROCEDURE — 99999 PR PBB SHADOW E&M-EST. PATIENT-LVL IV: CPT | Mod: PBBFAC,,, | Performed by: NURSE PRACTITIONER

## 2024-09-25 RX ORDER — SEMAGLUTIDE 0.68 MG/ML
0.5 INJECTION, SOLUTION SUBCUTANEOUS
Qty: 3 ML | Refills: 1 | Status: SHIPPED | OUTPATIENT
Start: 2024-09-25

## 2024-09-25 NOTE — PROGRESS NOTES
Patient ID: Mildred Meier is a 53 y.o. female.  Patient's current PCP is Liss Hudson DO.   Collaborating Physician: LAUREANO Gonzalez MD    Chief Complaint: Diabetes Mellitus    HPI  Mildred Meier is a 53 y.o. Black or  female presenting for a follow up for diabetes. Previously seen by me at another facility with LOV 8/24/22. Patient has been diagnosed with type 2 diabetes since 2016 .    Pertinent to decision making is/are the following comorbidities:  GERD  HLD  HTN  Complications related to diabetes: none  Recent diabetes related hospitalizations: none  Previous diabetes education: yes  Occupation:  at [employer]    Current diet: No fried foods. Trying to eat healthy. Portion sizes have decreased since starting Ozempic.Following recommendations. Weight stable  Activity level: exercises at home - now doing 5 x week      Changes made at last visit:  Start Ozempic 0.25 mg once a week x 4 weeks, then increase to 0.5 mg weekly  Continue:   Toujeo 15 units every morning  Glimepiride 4 mg at breakfast  Jardiance 25 mg at breakfast    Reinforced diet and exercise  Call if bs < 80    Current issues: High blood sugars      CURRENT DM MEDICATIONS:   Diabetes Medications               empagliflozin (JARDIANCE) 25 mg tablet Take 1 tablet by mouth in the morning for 180 days. For diabets    glimepiride (AMARYL) 4 MG tablet Take 4 mg by mouth daily with breakfast.    insulin glargine, TOUJEO, (TOUJEO SOLOSTAR U-300 INSULIN) 300 unit/mL (1.5 mL) InPn pen Inject 15 Units into the skin once daily.    semaglutide (OZEMPIC) 0.25 mg or 0.5 mg (2 mg/3 mL) pen injector Inject 0.25 mg into the skin every 7 days.   Tolerating medications without issue    Past failed treatment(s) include:   Metformin - rash  Ozempic 1 mg - felt she lost too much weight: down to 153#  Victoza  Timuvia      Meter/cgm: meter  Blood glucose testing is performed sporadically.   Patient is testing 0-1 times per day.  Any  "episodes of hypoglycemia? no   Glucose trends: 170s - 180s per patient report      Her blood sugar in the clinic today was: 172 mg/dl down from 226    Diabetes Management Status    Statin: Taking  ACE/ARB: Taking    Screening or Prevention Patient's value Goal Complete/Controlled?   HgA1C Testing and Control   Lab Results   Component Value Date    HGBA1C 9.5 (H) 08/09/2024      Annually/Less than 8% No   Lipid profile : 08/09/2024 Annually Yes   LDL control Lab Results   Component Value Date    LDLCALC 56.6 (L) 08/09/2024    Annually/Less than 100 mg/dl  Yes   Nephropathy screening Lab Results   Component Value Date    LABMICR <5.0 08/09/2024     Lab Results   Component Value Date    PROTEINUA Negative 05/20/2013     No results found for: "UTPCR"   Annually Yes   Blood pressure BP Readings from Last 1 Encounters:   09/06/24 119/68    Less than 140/90 Yes   Dilated retinal exam : 07/01/2024 Annually Yes   Foot exam   : 08/08/2024 Annually Yes       Preferred lab site: [facility]  Preferred visit site: [facility]    Weight at last appointment was 174 lbs  Wt Readings from Last 3 Encounters:   09/25/24 1606 78.6 kg (173 lb 4.5 oz)   09/18/24 1213 76.3 kg (168 lb 3.4 oz)   09/06/24 1041 76.3 kg (168 lb 3.4 oz)     Labs reviewed and are noted below.    Lab Results   Component Value Date    HGBA1C 9.5 (H) 08/09/2024    HGBA1C >14.0 (H) 03/14/2024    HGBA1C 11.1 (H) 08/29/2023     Lab Results   Component Value Date    WBC 6.29 04/21/2023    HGB 13.6 04/21/2023    HCT 41.9 04/21/2023     04/21/2023    CHOL 121 08/09/2024    TRIG 92 08/09/2024    HDL 46 08/09/2024    LDLCALC 56.6 (L) 08/09/2024    ALT 21 08/09/2024    AST 18 08/09/2024     08/09/2024    K 4.4 08/09/2024     08/09/2024    ANIONGAP 13 08/09/2024    CREATININE 1.2 08/09/2024    ESTGFRAFRICA 59 08/25/2021    EGFRNONAA 54.3 (A) 01/03/2017    BUN 18 08/09/2024    CO2 27 08/09/2024    TSH 1.02 08/29/2023     (H) 08/09/2024     Lab Results " "  Component Value Date    TSH 1.02 08/29/2023    IRON 82 09/18/2012    TIBC 413.0 09/18/2012    FERRITIN 15 (L) 09/18/2012    OKQPNFTS20 >2,000 (H) 06/30/2022    CALCIUM 10.1 08/09/2024     No results found for: "CPEPTIDE"  No results found for: "GLUTAMICACID"  Glucose   Date Value Ref Range Status   08/09/2024 193 (H) 70 - 110 mg/dL Final     Anion Gap   Date Value Ref Range Status   08/09/2024 13 8 - 16 mmol/L Final     eGFR if    Date Value Ref Range Status   01/03/2017 >60.0 >60 mL/min/1.73 m^2 Final     eGFR    Date Value Ref Range Status   08/25/2021 59  Final     eGFR if non    Date Value Ref Range Status   01/03/2017 54.3 (A) >60 mL/min/1.73 m^2 Final     Comment:     Calculation used to obtain the estimated glomerular filtration  rate (eGFR) is the CKD-EPI equation. Since race is unknown   in our information system, the eGFR values for   -American and Non--American patients are given   for each creatinine result.             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     Social Determinants of Health     Financial Resource Strain: Medium Risk (8/29/2023)    Received from [facility] [facility] and Its Subsidiaries and Affiliates, [employer] [facility] and Its Subsidiaries and Affiliates    Overall Financial Resource " Strain (CARDIA)     Difficulty of Paying Living Expenses: Somewhat hard   Food Insecurity: Food Insecurity Present (8/29/2023)    [facility], [facility] and Its [facility] and Affiliates    Hunger Vital Sign     Worried About Running Out of Food in the Last Year: Sometimes true     Ran Out of Food in the Last Year: Sometimes true   Transportation Needs: No Transportation Needs (8/29/2023)    [facility], [facility]    PRAPARE - Transportation     Lack of Transportation (Medical): No     Lack of Transportation (Non-Medical): No   Physical Activity: Sufficiently Active (8/29/2023)    [facility], [facility] and Its [facility] and Affiliates    Exercise Vital Sign     Days of Exercise per Week: 5 days     Minutes of Exercise per Session: 30 min   Stress: No Stress Concern Present (8/29/2023)    [facility], [facility] and Its [facility] and Affiliates    Citizen of [place] of Occupational Health - Occupational Stress Questionnaire     Feeling of Stress : Not at all   Housing Stability: High Risk (8/29/2023)    [facility], [facility] and Its [facility] and Affiliates    Housing Stability Vital Sign     Unable to Pay for Housing in the Last Year: Yes     Number of Places Lived in the Last Year: 1     Unstable Housing in the Last Year: Patient refused      Past Medical History:   Diagnosis Date    Diabetes mellitus, type 2     GERD (gastroesophageal reflux disease)     Hyperlipidemia     Hypertension        Review of Systems   Constitutional:  Negative for malaise/fatigue and weight loss.   Eyes:  Negative for blurred vision and double vision.   Respiratory:  Negative for shortness of breath.    Cardiovascular: Negative.    Gastrointestinal: Negative.    Genitourinary:  Negative for frequency.   Musculoskeletal:  Negative for myalgias.   Neurological: Negative.    Psychiatric/Behavioral: Negative.           Physical Exam  Vitals reviewed.   Constitutional:       General: She is not in acute distress.     Appearance: Normal appearance.   Eyes:      Conjunctiva/sclera: Conjunctivae normal.      Pupils: Pupils are equal, round, and reactive to light.   Cardiovascular:      Rate and Rhythm: Normal rate and regular rhythm.      Pulses: Normal pulses.   Pulmonary:      Effort: Pulmonary effort is normal.   Musculoskeletal:      Cervical back: Neck supple.      Right lower leg: No edema.      Left lower leg: No edema.   Skin:     General: Skin is warm and dry.   Neurological:      General: No focal deficit present.      Mental Status: She is alert and oriented to person, place, and time.   Psychiatric:         Mood and Affect: Mood normal.             Assessment & Plan      Uncontrolled type 2 diabetes mellitus with hyperglycemia - improving  -     POCT Glucose, Hand-Held Device  -     semaglutide (OZEMPIC) 0.25 mg or 0.5 mg (2 mg/3 mL) pen injector; Inject 0.5 mg into the skin every 7 days.  Dispense: 3 mL; Refill: 1  Increase Ozempic to 0.5 mg once a week  Continue same  Call if bs < 80    Obesity, Class I, BMI 30-34.9 - as above        - Follow up: 6 weeks    Visit today included increased complexity associated with the care of the episodic problem hyperglycemia addressed and managing the longitudinal care of the patient due to the serious and/or complex managed  problem(s) t2dm.

## 2024-10-10 ENCOUNTER — PATIENT OUTREACH (OUTPATIENT)
Dept: ADMINISTRATIVE | Facility: HOSPITAL | Age: 54
End: 2024-10-10
Payer: COMMERCIAL

## 2024-10-10 NOTE — PROGRESS NOTES
VBC activation completed.    VBHM Score: 0     Patient is not due for any topics at this time.                 Pt is current on . Has lab scheduled, 11/08/24 with follow up on 11/15/24.

## 2024-10-23 ENCOUNTER — TELEPHONE (OUTPATIENT)
Dept: DIABETES | Facility: CLINIC | Age: 54
End: 2024-10-23
Payer: COMMERCIAL

## 2024-10-30 ENCOUNTER — TELEPHONE (OUTPATIENT)
Dept: DERMATOLOGY | Facility: CLINIC | Age: 54
End: 2024-10-30
Payer: COMMERCIAL

## 2024-11-07 ENCOUNTER — OFFICE VISIT (OUTPATIENT)
Dept: DIABETES | Facility: CLINIC | Age: 54
End: 2024-11-07
Payer: COMMERCIAL

## 2024-11-07 VITALS
WEIGHT: 177.69 LBS | BODY MASS INDEX: 29.57 KG/M2 | SYSTOLIC BLOOD PRESSURE: 122 MMHG | DIASTOLIC BLOOD PRESSURE: 71 MMHG | HEART RATE: 73 BPM

## 2024-11-07 DIAGNOSIS — E11.65 UNCONTROLLED TYPE 2 DIABETES MELLITUS WITH HYPERGLYCEMIA: Primary | ICD-10-CM

## 2024-11-07 DIAGNOSIS — I10 HYPERTENSION GOAL BP (BLOOD PRESSURE) < 130/80: ICD-10-CM

## 2024-11-07 DIAGNOSIS — E66.811 OBESITY, CLASS I, BMI 30-34.9: ICD-10-CM

## 2024-11-07 LAB — GLUCOSE SERPL-MCNC: 314 MG/DL (ref 70–110)

## 2024-11-07 PROCEDURE — 99999 PR PBB SHADOW E&M-EST. PATIENT-LVL IV: CPT | Mod: PBBFAC,,, | Performed by: NURSE PRACTITIONER

## 2024-11-07 NOTE — PROGRESS NOTES
Patient ID: Mildred Meier is a 54 y.o. female.  Patient's current PCP is Liss Hudson DO.   Collaborating Physician: LAUREANO Gonzalez MD    Chief Complaint: Diabetes Mellitus    HPI  Mildred Meier is a 54 y.o. Black or  female presenting for a follow up for diabetes. Previously seen by me at another facility. Patient has been diagnosed with type 2 diabetes since 2016 .    Pertinent to decision making is/are the following comorbidities:  GERD  HLD  HTN  Complications related to diabetes: none  Recent diabetes related hospitalizations: none  Previous diabetes education: yes  Occupation:  at [employer]    Current diet: Increased intake of candy recently. Weight up slightly  Activity level: exercises at home - now doing 5 x week      Changes made at last visit:  Increase Ozempic to 0.5 mg once a week  Continue same  Call if bs < 80      Current issues:Missed Ozempic past 2 weeks      CURRENT DM MEDICATIONS:   Diabetes Medications               empagliflozin (JARDIANCE) 25 mg tablet Take 1 tablet by mouth in the morning for 180 days. For diabets    glimepiride (AMARYL) 4 MG tablet Take 4 mg by mouth daily with breakfast.    insulin glargine, TOUJEO, (TOUJEO SOLOSTAR U-300 INSULIN) 300 unit/mL (1.5 mL) InPn pen Inject 15 Units into the skin once daily.    semaglutide (OZEMPIC) 0.25 mg or 0.5 mg (2 mg/3 mL) pen injector Inject 0.25 mg into the skin every 7 days.  On Sundays - missed last 2    Tolerating medications without issue    Past failed treatment(s) include:   Metformin - rash  Ozempic 1 mg - felt she lost too much weight: down to 153#  Victoza  Januvia      Meter/cgm: meter  Blood glucose testing is performed sporadically.   Patient is testing 0-1 times per day.  Any episodes of hypoglycemia? no   Glucose trends: 160-170s when checking per report      Her blood sugar in the clinic today was: 341 mg/dl     Diabetes Management Status    Statin: Taking  ACE/ARB: Taking    Screening or  "Prevention Patient's value Goal Complete/Controlled?   HgA1C Testing and Control   Lab Results   Component Value Date    HGBA1C 9.5 (H) 08/09/2024      Annually/Less than 8% No   Lipid profile : 08/09/2024 Annually Yes   LDL control Lab Results   Component Value Date    LDLCALC 56.6 (L) 08/09/2024    Annually/Less than 100 mg/dl  Yes   Nephropathy screening Lab Results   Component Value Date    LABMICR <5.0 08/09/2024     Lab Results   Component Value Date    PROTEINUA Negative 05/20/2013     No results found for: "UTPCR"   Annually Yes   Blood pressure BP Readings from Last 1 Encounters:   11/07/24 122/71    Less than 140/90 Yes   Dilated retinal exam : 07/01/2024 Annually Yes   Foot exam   : 08/08/2024 Annually Yes       Preferred lab site: [facility]  Preferred visit site: [facility]    Weight at last appointment was 174 lbs  Wt Readings from Last 3 Encounters:   11/07/24 1406 80.6 kg (177 lb 11.1 oz)   09/25/24 1606 78.6 kg (173 lb 4.5 oz)   09/18/24 1213 76.3 kg (168 lb 3.4 oz)     Labs reviewed and are noted below.    Lab Results   Component Value Date    HGBA1C 9.5 (H) 08/09/2024    HGBA1C >14.0 (H) 03/14/2024    HGBA1C 11.1 (H) 08/29/2023     Lab Results   Component Value Date    WBC 6.29 04/21/2023    HGB 13.6 04/21/2023    HCT 41.9 04/21/2023     04/21/2023    CHOL 121 08/09/2024    TRIG 92 08/09/2024    HDL 46 08/09/2024    LDLCALC 56.6 (L) 08/09/2024    ALT 21 08/09/2024    AST 18 08/09/2024     08/09/2024    K 4.4 08/09/2024     08/09/2024    ANIONGAP 13 08/09/2024    CREATININE 1.2 08/09/2024    ESTGFRAFRICA 59 08/25/2021    EGFRNONAA 54.3 (A) 01/03/2017    BUN 18 08/09/2024    CO2 27 08/09/2024    TSH 1.02 08/29/2023     (H) 08/09/2024     Lab Results   Component Value Date    TSH 1.02 08/29/2023    IRON 82 09/18/2012    TIBC 413.0 09/18/2012    FERRITIN 15 (L) 09/18/2012    QUGXGSBF07 >2,000 (H) 06/30/2022    CALCIUM 10.1 08/09/2024     No results found for: "CPEPTIDE"  No results " "found for: "GLUTAMICACID"  Glucose   Date Value Ref Range Status   08/09/2024 193 (H) 70 - 110 mg/dL Final     Anion Gap   Date Value Ref Range Status   08/09/2024 13 8 - 16 mmol/L Final     eGFR if    Date Value Ref Range Status   01/03/2017 >60.0 >60 mL/min/1.73 m^2 Final     eGFR    Date Value Ref Range Status   08/25/2021 59  Final     eGFR if non    Date Value Ref Range Status   01/03/2017 54.3 (A) >60 mL/min/1.73 m^2 Final     Comment:     Calculation used to obtain the estimated glomerular filtration  rate (eGFR) is the CKD-EPI equation. Since race is unknown   in our information system, the eGFR values for   -American and Non--American patients are given   for each creatinine result.             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     Social Drivers of Health     Financial Resource Strain: Medium Risk (8/29/2023)    Received from [facility] [employer] [facility] Subsidiaries and Affiliates, [facility]    Overall Financial Resource Strain (CARDIA)     Difficulty of Paying Living Expenses: Somewhat hard   Food Insecurity: Food Insecurity Present (8/29/2023)    Received from [facility] Subsidiaries and Affiliates, [facility] " [facility]    Hunger Vital Sign     Worried About Running Out of Food in the Last Year: Sometimes true     Ran Out of Food in the Last Year: Sometimes true   Transportation Needs: No Transportation Needs (8/29/2023)    Received from [facility], [facility]    PRAPARE - Transportation     Lack of Transportation (Medical): No     Lack of Transportation (Non-Medical): No   Physical Activity: Sufficiently Active (8/29/2023)    Received from [facility], [facility]    Exercise Vital Sign     Days of Exercise per Week: 5 days     Minutes of Exercise per Session: 30 min   Stress: No Stress Concern Present (8/29/2023)    Received from [facility], [facility]    [facility] Occupational Health - Occupational Stress Questionnaire     Feeling of Stress : Not at all   Housing Stability: High Risk (8/29/2023)    Received from [facility], [facility]    Housing Stability Vital Sign     Unable to Pay for Housing in the Last Year: Yes     Number of Places Lived in the Last Year: 1     Unstable Housing in the Last Year: Patient refused     Past Medical History:   Diagnosis Date    Diabetes mellitus, type 2     GERD (gastroesophageal reflux disease)     Hyperlipidemia     Hypertension        Review of Systems   Constitutional:  Negative for malaise/fatigue and weight loss.    Eyes:  Negative for blurred vision and double vision.   Respiratory:  Negative for shortness of breath.    Cardiovascular: Negative.    Gastrointestinal: Negative.    Genitourinary:  Negative for frequency.   Musculoskeletal:  Negative for myalgias.   Neurological: Negative.    Psychiatric/Behavioral: Negative.           Physical Exam  Vitals reviewed.   Constitutional:       General: She is not in acute distress.     Appearance: Normal appearance.   Eyes:      Conjunctiva/sclera: Conjunctivae normal.      Pupils: Pupils are equal, round, and reactive to light.   Cardiovascular:      Rate and Rhythm: Normal rate and regular rhythm.      Pulses: Normal pulses.   Pulmonary:      Effort: Pulmonary effort is normal.   Musculoskeletal:      Cervical back: Neck supple.      Right lower leg: No edema.      Left lower leg: No edema.   Skin:     General: Skin is warm and dry.   Neurological:      General: No focal deficit present.      Mental Status: She is alert and oriented to person, place, and time.   Psychiatric:         Mood and Affect: Mood normal.             Assessment & Plan      Uncontrolled type 2 diabetes mellitus with hyperglycemia  -     POCT Glucose, Hand-Held Device  -     Resume Ozempic at 0.25 mg weekly x 2 weeks, then increase to 0.5 mg weekly  -     Reviewed and reinforced diet    Hypertension goal BP (blood pressure) < 130/80 - at goal on ARB    Obesity, Class I, BMI 30-34.9   - as above    Other orders  -     empagliflozin (JARDIANCE) 25 mg tablet; Take 1 tablet by mouth in the morning for 180 days. For diabets  Dispense: 90 tablet; Refill: 1  -     Fasting labs tomorrow per pcp    - Reviewed with patient:  The basic pathophysiology of Type 2 diabetes  Mechanism of action and action time of medications  Basic diet/carbohydrate counting/avoiding simple sugars/plate method  Proper hydration   Risk of complications and preventive measures  When to call for assistance        - Follow up:  4  weeks    Visit today included increased complexity associated with the care of the episodic problem hyperglycemia addressed and managing the longitudinal care of the patient due to the serious and/or complex managed problem(s) t2dm.

## 2024-11-08 ENCOUNTER — LAB VISIT (OUTPATIENT)
Dept: LAB | Facility: HOSPITAL | Age: 54
End: 2024-11-08
Attending: INTERNAL MEDICINE
Payer: COMMERCIAL

## 2024-11-08 DIAGNOSIS — I10 HYPERTENSION GOAL BP (BLOOD PRESSURE) < 130/80: ICD-10-CM

## 2024-11-08 DIAGNOSIS — E78.5 HYPERLIPIDEMIA LDL GOAL <70: ICD-10-CM

## 2024-11-08 DIAGNOSIS — E11.65 UNCONTROLLED TYPE 2 DIABETES MELLITUS WITH HYPERGLYCEMIA, WITH LONG-TERM CURRENT USE OF INSULIN: ICD-10-CM

## 2024-11-08 DIAGNOSIS — Z79.4 UNCONTROLLED TYPE 2 DIABETES MELLITUS WITH HYPERGLYCEMIA, WITH LONG-TERM CURRENT USE OF INSULIN: ICD-10-CM

## 2024-11-08 LAB
ALBUMIN SERPL BCP-MCNC: 4 G/DL (ref 3.5–5.2)
ALP SERPL-CCNC: 98 U/L (ref 40–150)
ALT SERPL W/O P-5'-P-CCNC: 87 U/L (ref 10–44)
ANION GAP SERPL CALC-SCNC: 13 MMOL/L (ref 8–16)
AST SERPL-CCNC: 49 U/L (ref 10–40)
BILIRUB SERPL-MCNC: 0.5 MG/DL (ref 0.1–1)
BUN SERPL-MCNC: 18 MG/DL (ref 6–20)
CALCIUM SERPL-MCNC: 9.8 MG/DL (ref 8.7–10.5)
CHLORIDE SERPL-SCNC: 101 MMOL/L (ref 95–110)
CHOLEST SERPL-MCNC: 128 MG/DL (ref 120–199)
CHOLEST/HDLC SERPL: 2.6 {RATIO} (ref 2–5)
CO2 SERPL-SCNC: 26 MMOL/L (ref 23–29)
CREAT SERPL-MCNC: 1.1 MG/DL (ref 0.5–1.4)
EST. GFR  (NO RACE VARIABLE): 59.7 ML/MIN/1.73 M^2
ESTIMATED AVG GLUCOSE: 206 MG/DL (ref 68–131)
GLUCOSE SERPL-MCNC: 178 MG/DL (ref 70–110)
HBA1C MFR BLD: 8.8 % (ref 4–5.6)
HDLC SERPL-MCNC: 49 MG/DL (ref 40–75)
HDLC SERPL: 38.3 % (ref 20–50)
LDLC SERPL CALC-MCNC: 59.4 MG/DL (ref 63–159)
NONHDLC SERPL-MCNC: 79 MG/DL
POTASSIUM SERPL-SCNC: 3.8 MMOL/L (ref 3.5–5.1)
PROT SERPL-MCNC: 7.8 G/DL (ref 6–8.4)
SODIUM SERPL-SCNC: 140 MMOL/L (ref 136–145)
TRIGL SERPL-MCNC: 98 MG/DL (ref 30–150)

## 2024-11-08 PROCEDURE — 80053 COMPREHEN METABOLIC PANEL: CPT | Performed by: INTERNAL MEDICINE

## 2024-11-08 PROCEDURE — 80061 LIPID PANEL: CPT | Performed by: INTERNAL MEDICINE

## 2024-11-08 PROCEDURE — 83036 HEMOGLOBIN GLYCOSYLATED A1C: CPT | Performed by: INTERNAL MEDICINE

## 2024-11-08 PROCEDURE — 36415 COLL VENOUS BLD VENIPUNCTURE: CPT | Performed by: INTERNAL MEDICINE

## 2024-11-15 ENCOUNTER — OFFICE VISIT (OUTPATIENT)
Dept: INTERNAL MEDICINE | Facility: CLINIC | Age: 54
End: 2024-11-15
Payer: COMMERCIAL

## 2024-11-15 VITALS
OXYGEN SATURATION: 98 % | TEMPERATURE: 97 F | SYSTOLIC BLOOD PRESSURE: 122 MMHG | BODY MASS INDEX: 29.31 KG/M2 | WEIGHT: 176.13 LBS | RESPIRATION RATE: 20 BRPM | HEART RATE: 75 BPM | DIASTOLIC BLOOD PRESSURE: 70 MMHG

## 2024-11-15 DIAGNOSIS — E11.65 UNCONTROLLED TYPE 2 DIABETES MELLITUS WITH HYPERGLYCEMIA: ICD-10-CM

## 2024-11-15 DIAGNOSIS — R74.8 ELEVATED LIVER ENZYMES: ICD-10-CM

## 2024-11-15 DIAGNOSIS — I10 HYPERTENSION GOAL BP (BLOOD PRESSURE) < 130/80: Primary | ICD-10-CM

## 2024-11-15 DIAGNOSIS — R51.9 NONINTRACTABLE HEADACHE, UNSPECIFIED CHRONICITY PATTERN, UNSPECIFIED HEADACHE TYPE: ICD-10-CM

## 2024-11-15 PROCEDURE — 99999 PR PBB SHADOW E&M-EST. PATIENT-LVL IV: CPT | Mod: PBBFAC,,, | Performed by: PHYSICIAN ASSISTANT

## 2024-11-15 RX ORDER — GLIMEPIRIDE 4 MG/1
4 TABLET ORAL 2 TIMES DAILY WITH MEALS
Qty: 90 TABLET | Refills: 3 | Status: SHIPPED | OUTPATIENT
Start: 2024-11-15

## 2024-11-15 RX ORDER — LANCETS
EACH MISCELLANEOUS
Qty: 200 EACH | Refills: 1 | Status: SHIPPED | OUTPATIENT
Start: 2024-11-15

## 2024-11-15 RX ORDER — LOSARTAN POTASSIUM AND HYDROCHLOROTHIAZIDE 12.5; 1 MG/1; MG/1
1 TABLET ORAL
Qty: 90 TABLET | Refills: 3 | Status: SHIPPED | OUTPATIENT
Start: 2024-11-15

## 2024-11-15 RX ORDER — BUTALBITAL, ACETAMINOPHEN AND CAFFEINE 50; 325; 40 MG/1; MG/1; MG/1
1 TABLET ORAL EVERY 6 HOURS PRN
Qty: 30 TABLET | Refills: 1 | Status: SHIPPED | OUTPATIENT
Start: 2024-11-15

## 2024-11-15 RX ORDER — INSULIN PUMP SYRINGE, 3 ML
EACH MISCELLANEOUS
Qty: 1 EACH | Refills: 0 | Status: SHIPPED | OUTPATIENT
Start: 2024-11-15 | End: 2025-11-15

## 2024-11-15 NOTE — PROGRESS NOTES
Subjective:      Patient ID: Mildred Meier is a 54 y.o. female.    Chief Complaint: Follow-up (She is here for a 3 month follow up, here to discuss labs. )    Patient is new to me, being seen today for 3mth f/u.     HTN- losartan-HCTZ 100-12.5mg   HLD- on statin therapy   DM- A1c 8.8%, ozempic .5mg, jardiance 25mg, glimepiride 4mg bid, toujeo 15 units  Followed by Diabetes Mgmt, visit last week, meds adjusted     Labs recently completed, A1c improving but remains uncontrolled, cholesterol good, liver enzymes elevated   Denies excess Tylenol, rare alcohol use   Drinks adequate water   Denies GI upset   Has been on statin for quite some time     Last visit Aug 2024 w PCP.      Review of Systems   Constitutional:  Negative for chills, diaphoresis and fever.   HENT:  Negative for congestion, rhinorrhea and sore throat.    Respiratory:  Negative for cough, shortness of breath and wheezing.    Cardiovascular:  Negative for chest pain and leg swelling.   Gastrointestinal:  Negative for abdominal pain, constipation, diarrhea, nausea and vomiting.   Skin:  Negative for rash.   Neurological:  Negative for dizziness, light-headedness and headaches.       Objective:   /70 (BP Location: Right arm, Patient Position: Sitting)   Pulse 75   Temp 96.9 °F (36.1 °C) (Tympanic)   Resp 20   Wt 79.9 kg (176 lb 2.4 oz)   SpO2 98%   BMI 29.31 kg/m²   Physical Exam  Constitutional:       General: She is not in acute distress.     Appearance: Normal appearance. She is well-developed. She is not ill-appearing.   HENT:      Head: Normocephalic and atraumatic.   Cardiovascular:      Rate and Rhythm: Normal rate and regular rhythm.      Heart sounds: Normal heart sounds. No murmur heard.  Pulmonary:      Effort: Pulmonary effort is normal. No respiratory distress.      Breath sounds: Normal breath sounds. No decreased breath sounds.   Musculoskeletal:      Right lower leg: No edema.      Left lower leg: No edema.   Skin:     General:  Skin is warm and dry.      Findings: No rash.   Psychiatric:         Speech: Speech normal.         Behavior: Behavior normal.         Thought Content: Thought content normal.       Assessment:      1. Hypertension goal BP (blood pressure) < 130/80    2. Uncontrolled type 2 diabetes mellitus with hyperglycemia    3. Elevated liver enzymes    4. Nonintractable headache, unspecified chronicity pattern, unspecified headache type       Plan:   Hypertension goal BP (blood pressure) < 130/80  -     CBC Auto Differential; Future; Expected date: 11/15/2024  -     Comprehensive Metabolic Panel; Future; Expected date: 11/15/2024  -     Lipid Panel; Future; Expected date: 11/15/2024  -     losartan-hydrochlorothiazide 100-12.5 mg (HYZAAR) 100-12.5 mg Tab; Take 1 tablet by mouth once daily for blood pressure  Dispense: 90 tablet; Refill: 3    Uncontrolled type 2 diabetes mellitus with hyperglycemia  -     Hemoglobin A1C; Future; Expected date: 11/15/2024  -     glimepiride (AMARYL) 4 MG tablet; Take 1 tablet (4 mg total) by mouth 2 (two) times daily with meals.  Dispense: 90 tablet; Refill: 3  -     blood-glucose meter kit; To check BG two times daily, to use with insurance preferred meter  Dispense: 1 each; Refill: 0  -     lancets Misc; To check BG two times daily, to use with insurance preferred meter  Dispense: 200 each; Refill: 1  -     blood sugar diagnostic Strp; To check BG two times daily, to use with insurance preferred meter  Dispense: 200 each; Refill: 1    Elevated liver enzymes  -     Hepatic Function Panel; Future; Expected date: 11/15/2024    Nonintractable headache, unspecified chronicity pattern, unspecified headache type  -     butalbital-acetaminophen-caffeine -40 mg (FIORICET, ESGIC) -40 mg per tablet; Take 1 tablet by mouth every 6 (six) hours as needed for Headaches.  Dispense: 30 tablet; Refill: 1      Recheck liver labs in 2wks, if persistently elevated or worsened consider holding statin  for period of time     3mth f/u w labs prior

## 2024-12-11 ENCOUNTER — TELEPHONE (OUTPATIENT)
Dept: DERMATOLOGY | Facility: CLINIC | Age: 54
End: 2024-12-11
Payer: COMMERCIAL

## 2024-12-11 NOTE — TELEPHONE ENCOUNTER
Called pt regarding r/s appointment on 12/11 due to epic being down. Pt successfully r/s for next available. Appt added to wait list. Pt verbalized understanding. Appointment on 12/11 cancelled per pt .

## 2025-01-14 ENCOUNTER — OFFICE VISIT (OUTPATIENT)
Dept: DERMATOLOGY | Facility: CLINIC | Age: 55
End: 2025-01-14
Payer: COMMERCIAL

## 2025-01-14 DIAGNOSIS — L23.2 ALLERGIC CONTACT DERMATITIS DUE TO COSMETICS: Primary | ICD-10-CM

## 2025-01-14 PROCEDURE — 3072F LOW RISK FOR RETINOPATHY: CPT | Mod: CPTII,S$GLB,, | Performed by: PHYSICIAN ASSISTANT

## 2025-01-14 PROCEDURE — G2211 COMPLEX E/M VISIT ADD ON: HCPCS | Mod: S$GLB,,, | Performed by: PHYSICIAN ASSISTANT

## 2025-01-14 PROCEDURE — 1160F RVW MEDS BY RX/DR IN RCRD: CPT | Mod: CPTII,S$GLB,, | Performed by: PHYSICIAN ASSISTANT

## 2025-01-14 PROCEDURE — 99214 OFFICE O/P EST MOD 30 MIN: CPT | Mod: S$GLB,,, | Performed by: PHYSICIAN ASSISTANT

## 2025-01-14 PROCEDURE — 99999 PR PBB SHADOW E&M-EST. PATIENT-LVL IV: CPT | Mod: PBBFAC,,, | Performed by: PHYSICIAN ASSISTANT

## 2025-01-14 PROCEDURE — 1159F MED LIST DOCD IN RCRD: CPT | Mod: CPTII,S$GLB,, | Performed by: PHYSICIAN ASSISTANT

## 2025-01-14 RX ORDER — TRIAMCINOLONE ACETONIDE 0.25 MG/G
CREAM TOPICAL 2 TIMES DAILY
Qty: 30 G | Refills: 0 | Status: SHIPPED | OUTPATIENT
Start: 2025-01-14

## 2025-01-14 NOTE — PATIENT INSTRUCTIONS
New Skin Care Regimen  Soap: Dove sensitive skin bar or Vanicream Gentle Cleanser or Cetaphil Gentle Cleanser  Moisturizer: ceraVe or cetaphil cream or Vanicream  Detergent: Tide Free, *All Free, or Cheer Free   Fabric softener: do not use  Colognes/Perfumes/Fragrances: do not use  Bathing: shower or bathe with lukewarm water < 10 minutes

## 2025-01-14 NOTE — PROGRESS NOTES
Subjective:      Patient ID:  Mildred Meier is a 54 y.o. female who presents for   Chief Complaint   Patient presents with    Rash     Located on the chin and neck X 3 days s/s sweating, itching, spreading, redness , burning Tx anti-itch cream / little improvement      55 yo female here for c/o rash of left neck which appeared about 3 days ago after using a new tumeric and black charcoal soap. She notes the soap caused dryness of her face and kind of made her itchy. Now with redness in patches of left neck and marked dryness of left jawline. +Red, slightly raised, itchy, burning.  Denies new skin care products other than that soap. History of wearing a 14 k gold necklace, which she has stopped since rash onset.     Current tx: anti-itch cream (otc), eczema cream (otc)        Review of Systems   Constitutional:  Negative for fever and chills.   Gastrointestinal:  Negative for nausea and vomiting.   Skin:  Positive for itching, rash, dry skin and activity-related sunscreen use. Negative for sun sensitivity, daily sunscreen use, recent sunburn and dry lips.   Hematologic/Lymphatic: Does not bruise/bleed easily.       Objective:   Physical Exam   Constitutional: She appears well-developed and well-nourished. No distress.   Neurological: She is alert and oriented to person, place, and time. She is not disoriented.   Psychiatric: She has a normal mood and affect.   Skin:   Areas Examined (abnormalities noted in diagram):   Head / Face Inspection Performed  Neck Inspection Performed  Chest / Axilla Inspection Performed  Back Inspection Performed  RUE Inspected  LUE Inspection Performed            Diagram Legend     Erythematous scaling macule/papule c/w actinic keratosis       Vascular papule c/w angioma      Pigmented verrucoid papule/plaque c/w seborrheic keratosis      Yellow umbilicated papule c/w sebaceous hyperplasia      Irregularly shaped tan macule c/w lentigo     1-2 mm smooth white papules consistent with  Milia      Movable subcutaneous cyst with punctum c/w epidermal inclusion cyst      Subcutaneous movable cyst c/w pilar cyst      Firm pink to brown papule c/w dermatofibroma      Pedunculated fleshy papule(s) c/w skin tag(s)      Evenly pigmented macule c/w junctional nevus     Mildly variegated pigmented, slightly irregular-bordered macule c/w mildly atypical nevus      Flesh colored to evenly pigmented papule c/w intradermal nevus       Pink pearly papule/plaque c/w basal cell carcinoma      Erythematous hyperkeratotic cursted plaque c/w SCC      Surgical scar with no sign of skin cancer recurrence      Open and closed comedones      Inflammatory papules and pustules      Verrucoid papule consistent consistent with wart     Erythematous eczematous patches and plaques     Dystrophic onycholytic nail with subungual debris c/w onychomycosis     Umbilicated papule    Erythematous-base heme-crusted tan verrucoid plaque consistent with inflamed seborrheic keratosis     Erythematous Silvery Scaling Plaque c/w Psoriasis     See annotation      Assessment / Plan:        Allergic contact dermatitis due to cosmetics  -     triamcinolone acetonide 0.025% (KENALOG) 0.025 % cream; Apply topically 2 (two) times daily. PRN rash of neck. Steroid- limit to 2 weeks then taper.  Dispense: 30 g; Refill: 0  Onset after a new cleanser. Advised d/c of cleanser. Discussed dx, tx options. Encouraged strict free and clear regimen, holiday from jewelry, and avoidance of fragrance.  Discussed delayed hypersensitivity reactions and timeline.            Follow up if symptoms worsen or fail to improve.

## 2025-01-16 ENCOUNTER — OFFICE VISIT (OUTPATIENT)
Dept: DIABETES | Facility: CLINIC | Age: 55
End: 2025-01-16
Payer: COMMERCIAL

## 2025-01-16 VITALS
BODY MASS INDEX: 29.39 KG/M2 | SYSTOLIC BLOOD PRESSURE: 138 MMHG | DIASTOLIC BLOOD PRESSURE: 78 MMHG | WEIGHT: 176.56 LBS | HEART RATE: 85 BPM

## 2025-01-16 DIAGNOSIS — E11.65 UNCONTROLLED TYPE 2 DIABETES MELLITUS WITH HYPERGLYCEMIA: Primary | ICD-10-CM

## 2025-01-16 DIAGNOSIS — I10 HYPERTENSION GOAL BP (BLOOD PRESSURE) < 130/80: ICD-10-CM

## 2025-01-16 DIAGNOSIS — E66.811 OBESITY, CLASS I, BMI 30-34.9: ICD-10-CM

## 2025-01-16 LAB — GLUCOSE SERPL-MCNC: 101 MG/DL (ref 70–110)

## 2025-01-16 PROCEDURE — 3075F SYST BP GE 130 - 139MM HG: CPT | Mod: CPTII,S$GLB,, | Performed by: NURSE PRACTITIONER

## 2025-01-16 PROCEDURE — 3072F LOW RISK FOR RETINOPATHY: CPT | Mod: CPTII,S$GLB,, | Performed by: NURSE PRACTITIONER

## 2025-01-16 PROCEDURE — 1159F MED LIST DOCD IN RCRD: CPT | Mod: CPTII,S$GLB,, | Performed by: NURSE PRACTITIONER

## 2025-01-16 PROCEDURE — G2211 COMPLEX E/M VISIT ADD ON: HCPCS | Mod: S$GLB,,, | Performed by: NURSE PRACTITIONER

## 2025-01-16 PROCEDURE — 1160F RVW MEDS BY RX/DR IN RCRD: CPT | Mod: CPTII,S$GLB,, | Performed by: NURSE PRACTITIONER

## 2025-01-16 PROCEDURE — 82962 GLUCOSE BLOOD TEST: CPT | Mod: S$GLB,,, | Performed by: NURSE PRACTITIONER

## 2025-01-16 PROCEDURE — 3008F BODY MASS INDEX DOCD: CPT | Mod: CPTII,S$GLB,, | Performed by: NURSE PRACTITIONER

## 2025-01-16 PROCEDURE — 99214 OFFICE O/P EST MOD 30 MIN: CPT | Mod: S$GLB,,, | Performed by: NURSE PRACTITIONER

## 2025-01-16 PROCEDURE — 3078F DIAST BP <80 MM HG: CPT | Mod: CPTII,S$GLB,, | Performed by: NURSE PRACTITIONER

## 2025-01-16 PROCEDURE — 99999 PR PBB SHADOW E&M-EST. PATIENT-LVL IV: CPT | Mod: PBBFAC,,, | Performed by: NURSE PRACTITIONER

## 2025-01-16 RX ORDER — SEMAGLUTIDE 0.68 MG/ML
0.5 INJECTION, SOLUTION SUBCUTANEOUS
Qty: 3 ML | Refills: 3 | Status: SHIPPED | OUTPATIENT
Start: 2025-01-16

## 2025-01-16 NOTE — PROGRESS NOTES
"    Patient ID: Mildred Meier is a 54 y.o. female.  Patient's current PCP is Liss Hudson DO.   Collaborating Physician: LAUREANO Gonzalez MD    Chief Complaint: Diabetes Mellitus    HPI:  Mildred Meier is a 54 y.o. Black or  female presenting for a follow up for diabetes. Previously seen by me at another facility.     Patient has been diagnosed with type 2 diabetes since 2016 .  Pertinent to decision making is/are the following comorbidities:  GERD  HLD  HTN  Complications related to diabetes: none  Recent diabetes related hospitalizations: none  Previous diabetes education: yes  Occupation:  at the [employer],     Current diet:"Leaving foods she should not eat alone". Weight stable  Activity level: exercises at home - now doing 5 x week      Changes made at last visit:  -     Resume Ozempic at 0.25 mg weekly x 2 weeks, then increase to 0.5 mg weekly  -     Reviewed and reinforced diet    Current issues:States muscle cramps and spasms resolved with adding Ca++/Mg/Zn supplement. Tolerating meds well      CURRENT DM MEDICATIONS:   Diabetes Medications               empagliflozin (JARDIANCE) 25 mg tablet Take 1 tablet by mouth in the morning for 180 days. For diabets    glimepiride (AMARYL) 4 MG tablet Take 1 tablet (4 mg total) by mouth 2 (two) times daily with meals.    insulin glargine, TOUJEO, (TOUJEO SOLOSTAR U-300 INSULIN) 300 unit/mL (1.5 mL) InPn pen Inject 15 Units into the skin once daily.    semaglutide (OZEMPIC) 0.25 mg or 0.5 mg (2 mg/3 mL) pen injector Inject 0.5 mg into the skin every 7 days.     Past failed treatment(s) include:   Metformin - rash  Ozempic 1 mg - felt she lost too much weight: down to 153#  Victoza  Januvia      Meter/cgm: meter  Blood glucose testing is performed sporadically.   Patient is testing 0-1 times per day.  Any episodes of hypoglycemia? no   Glucose trends: 112-160 with higher in pm when checking per report      Her blood sugar in the clinic " "today was: 101 mg/dl     Diabetes Management Status    Statin: Taking  ACE/ARB: Taking    Screening or Prevention Patient's value Goal Complete/Controlled?   HgA1C Testing and Control   Lab Results   Component Value Date    HGBA1C 8.8 (H) 11/08/2024      Annually/Less than 8% No   Lipid profile : 11/08/2024 Annually Yes   LDL control Lab Results   Component Value Date    LDLCALC 59.4 (L) 11/08/2024    Annually/Less than 100 mg/dl  Yes   Nephropathy screening Lab Results   Component Value Date    LABMICR <5.0 08/09/2024     Lab Results   Component Value Date    PROTEINUA Negative 05/20/2013     No results found for: "UTPCR"   Annually Yes   Blood pressure BP Readings from Last 1 Encounters:   01/16/25 138/78    Less than 140/90 Yes   Dilated retinal exam : 07/01/2024 Annually Yes   Foot exam   : 08/08/2024 Annually Yes       Preferred lab site: [place]  Preferred visit site: [place]    Weight at last appointment was 174 lbs  Wt Readings from Last 3 Encounters:   01/16/25 1539 80.1 kg (176 lb 9.4 oz)   11/15/24 1524 79.9 kg (176 lb 2.4 oz)   11/07/24 1406 80.6 kg (177 lb 11.1 oz)     Labs reviewed and are noted below.    Lab Results   Component Value Date    HGBA1C 8.8 (H) 11/08/2024    HGBA1C 9.5 (H) 08/09/2024    HGBA1C >14.0 (H) 03/14/2024     Lab Results   Component Value Date    WBC 6.29 04/21/2023    HGB 13.6 04/21/2023    HCT 41.9 04/21/2023     04/21/2023    CHOL 128 11/08/2024    TRIG 98 11/08/2024    HDL 49 11/08/2024    LDLCALC 59.4 (L) 11/08/2024    ALT 87 (H) 11/08/2024    AST 49 (H) 11/08/2024     11/08/2024    K 3.8 11/08/2024     11/08/2024    ANIONGAP 13 11/08/2024    CREATININE 1.1 11/08/2024    ESTGFRAFRICA 59 08/25/2021    EGFRNONAA 54.3 (A) 01/03/2017    BUN 18 11/08/2024    CO2 26 11/08/2024    TSH 1.02 08/29/2023     (H) 11/08/2024     Lab Results   Component Value Date    TSH 1.02 08/29/2023    IRON 82 09/18/2012    TIBC 413.0 09/18/2012    FERRITIN 15 (L) 09/18/2012    " "QTYBSACR29 >2,000 (H) 06/30/2022    CALCIUM 9.8 11/08/2024     No results found for: "CPEPTIDE"  No results found for: "GLUTAMICACID"  Glucose   Date Value Ref Range Status   11/08/2024 178 (H) 70 - 110 mg/dL Final     Anion Gap   Date Value Ref Range Status   11/08/2024 13 8 - 16 mmol/L Final     eGFR if    Date Value Ref Range Status   01/03/2017 >60.0 >60 mL/min/1.73 m^2 Final     eGFR    Date Value Ref Range Status   08/25/2021 59  Final     eGFR if non    Date Value Ref Range Status   01/03/2017 54.3 (A) >60 mL/min/1.73 m^2 Final     Comment:     Calculation used to obtain the estimated glomerular filtration  rate (eGFR) is the CKD-EPI equation. Since race is unknown   in our information system, the eGFR values for   -American and Non--American patients are given   for each creatinine result.             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     Social Drivers of Health     Financial Resource Strain: Medium Risk (8/29/2023)    Received from [facility], [facility]    Overall Financial Resource Strain (CARDIA)     Difficulty of Paying Living Expenses: Somewhat hard   Food Insecurity: Food Insecurity Present (8/29/2023)    " [facility], [facility]    Hunger Vital Sign     Worried About Running Out of Food in the Last Year: Sometimes true     Ran Out of Food in the Last Year: Sometimes true   Transportation Needs: No Transportation Needs (8/29/2023)    [facility], [facility]    PRAPARE - Transportation     Lack of Transportation (Medical): No     Lack of Transportation (Non-Medical): No   Physical Activity: Sufficiently Active (8/29/2023)    [facility], [facility]    Exercise Vital Sign     Days of Exercise per Week: 5 days     Minutes of Exercise per Session: 30 min   Stress: No Stress Concern Present (8/29/2023)    [facility], [facility]    Citizen of [place] [employer] - Occupational Stress Questionnaire     Feeling of Stress : Not at all   Housing Stability: High Risk (8/29/2023)    [facility], [facility]    Housing Stability Vital Sign     Unable to Pay for Housing in the Last Year: Yes     Number of Places Lived in the Last Year: 1     Unstable Housing in the Last Year: Patient refused     Past Medical History:   Diagnosis Date    Diabetes mellitus, type 2     GERD (gastroesophageal reflux disease)     Hyperlipidemia      Hypertension        Review of Systems   Constitutional:  Negative for malaise/fatigue and weight loss.   Eyes:  Negative for blurred vision and double vision.   Respiratory:  Negative for shortness of breath.    Cardiovascular: Negative.    Gastrointestinal: Negative.    Genitourinary:  Negative for frequency.   Musculoskeletal:  Negative for myalgias.   Neurological: Negative.    Psychiatric/Behavioral: Negative.           Physical Exam  Vitals reviewed.   Constitutional:       General: She is not in acute distress.     Appearance: Normal appearance.   Eyes:      Conjunctiva/sclera: Conjunctivae normal.      Pupils: Pupils are equal, round, and reactive to light.   Neck:      Thyroid: No thyroid mass, thyromegaly or thyroid tenderness.      Vascular: No carotid bruit.   Cardiovascular:      Rate and Rhythm: Normal rate and regular rhythm.      Pulses: Normal pulses.      Heart sounds: Normal heart sounds.   Pulmonary:      Effort: Pulmonary effort is normal.      Breath sounds: Normal breath sounds.   Abdominal:      General: Bowel sounds are normal.      Palpations: Abdomen is soft.   Musculoskeletal:      Cervical back: Normal range of motion and neck supple.      Right lower leg: No edema.      Left lower leg: No edema.   Skin:     General: Skin is warm and dry.      Comments: Sites without hypertrophy and/or infection   Neurological:      General: No focal deficit present.      Mental Status: She is alert and oriented to person, place, and time.   Psychiatric:         Mood and Affect: Mood normal.         Behavior: Behavior normal.             Assessment & Plan    Uncontrolled type 2 diabetes mellitus with hyperglycemia - improving but not yet to goal  -     POCT Glucose, Hand-Held Device  -     Magnesium; Future; Expected date: 01/16/2025 - check on supplement  -     semaglutide (OZEMPIC) 0.25 mg or 0.5 mg (2 mg/3 mL) pen injector; Inject 0.5 mg into the skin every 7 days.  Dispense: 3 mL; Refill: 3  -       Increase Lantus to 17 units daily  -      Declines increase in Ozempic  -      Reinforced diet and exercise    Obesity, Class I, BMI 30-34.9- stable  -      Reinforced diet and exercise    Hypertension goal BP (blood pressure) < 130/80- stable on ARB      - Reviewed with patient:  The basic pathophysiology of Type 2 diabetes  Mechanism of action and action time of medications  Use of home glucose monitor/cgm  Basic diet/carbohydrate counting/avoiding simple sugars/plate method  Proper hydration   Risk of complications and preventive measures  When to call for assistance        - Follow up: 3 months    Visit today included increased complexity associated with the care of the episodic problem hyperglycemia addressed and managing the longitudinal care of the patient due to the serious and/or complex managed problem(s) t2dm.

## 2025-01-21 ENCOUNTER — TELEPHONE (OUTPATIENT)
Dept: OPHTHALMOLOGY | Facility: CLINIC | Age: 55
End: 2025-01-21
Payer: COMMERCIAL

## 2025-02-07 ENCOUNTER — LAB VISIT (OUTPATIENT)
Dept: LAB | Facility: HOSPITAL | Age: 55
End: 2025-02-07
Attending: PHYSICIAN ASSISTANT
Payer: COMMERCIAL

## 2025-02-07 DIAGNOSIS — I10 HYPERTENSION GOAL BP (BLOOD PRESSURE) < 130/80: ICD-10-CM

## 2025-02-07 DIAGNOSIS — E11.65 UNCONTROLLED TYPE 2 DIABETES MELLITUS WITH HYPERGLYCEMIA: ICD-10-CM

## 2025-02-07 LAB
ALBUMIN SERPL BCP-MCNC: 4.2 G/DL (ref 3.5–5.2)
ALP SERPL-CCNC: 86 U/L (ref 40–150)
ALT SERPL W/O P-5'-P-CCNC: 32 U/L (ref 10–44)
ANION GAP SERPL CALC-SCNC: 13 MMOL/L (ref 8–16)
AST SERPL-CCNC: 20 U/L (ref 10–40)
BASOPHILS # BLD AUTO: 0.04 K/UL (ref 0–0.2)
BASOPHILS NFR BLD: 0.5 % (ref 0–1.9)
BILIRUB SERPL-MCNC: 0.5 MG/DL (ref 0.1–1)
BUN SERPL-MCNC: 20 MG/DL (ref 6–20)
CALCIUM SERPL-MCNC: 9.4 MG/DL (ref 8.7–10.5)
CHLORIDE SERPL-SCNC: 103 MMOL/L (ref 95–110)
CHOLEST SERPL-MCNC: 129 MG/DL (ref 120–199)
CHOLEST/HDLC SERPL: 2.6 {RATIO} (ref 2–5)
CO2 SERPL-SCNC: 23 MMOL/L (ref 23–29)
CREAT SERPL-MCNC: 1.3 MG/DL (ref 0.5–1.4)
DIFFERENTIAL METHOD BLD: NORMAL
EOSINOPHIL # BLD AUTO: 0.2 K/UL (ref 0–0.5)
EOSINOPHIL NFR BLD: 1.9 % (ref 0–8)
ERYTHROCYTE [DISTWIDTH] IN BLOOD BY AUTOMATED COUNT: 13.4 % (ref 11.5–14.5)
EST. GFR  (NO RACE VARIABLE): 48.9 ML/MIN/1.73 M^2
ESTIMATED AVG GLUCOSE: 246 MG/DL (ref 68–131)
GLUCOSE SERPL-MCNC: 199 MG/DL (ref 70–110)
HBA1C MFR BLD: 10.2 % (ref 4–5.6)
HCT VFR BLD AUTO: 44.4 % (ref 37–48.5)
HDLC SERPL-MCNC: 49 MG/DL (ref 40–75)
HDLC SERPL: 38 % (ref 20–50)
HGB BLD-MCNC: 14.5 G/DL (ref 12–16)
IMM GRANULOCYTES # BLD AUTO: 0.01 K/UL (ref 0–0.04)
IMM GRANULOCYTES NFR BLD AUTO: 0.1 % (ref 0–0.5)
LDLC SERPL CALC-MCNC: 61.2 MG/DL (ref 63–159)
LYMPHOCYTES # BLD AUTO: 1.8 K/UL (ref 1–4.8)
LYMPHOCYTES NFR BLD: 22.8 % (ref 18–48)
MAGNESIUM SERPL-MCNC: 2.3 MG/DL (ref 1.6–2.6)
MCH RBC QN AUTO: 29.3 PG (ref 27–31)
MCHC RBC AUTO-ENTMCNC: 32.7 G/DL (ref 32–36)
MCV RBC AUTO: 90 FL (ref 82–98)
MONOCYTES # BLD AUTO: 0.5 K/UL (ref 0.3–1)
MONOCYTES NFR BLD: 6.4 % (ref 4–15)
NEUTROPHILS # BLD AUTO: 5.3 K/UL (ref 1.8–7.7)
NEUTROPHILS NFR BLD: 68.3 % (ref 38–73)
NONHDLC SERPL-MCNC: 80 MG/DL
NRBC BLD-RTO: 0 /100 WBC
PLATELET # BLD AUTO: 251 K/UL (ref 150–450)
PMV BLD AUTO: 12.2 FL (ref 9.2–12.9)
POTASSIUM SERPL-SCNC: 4.2 MMOL/L (ref 3.5–5.1)
PROT SERPL-MCNC: 8.2 G/DL (ref 6–8.4)
RBC # BLD AUTO: 4.95 M/UL (ref 4–5.4)
SODIUM SERPL-SCNC: 139 MMOL/L (ref 136–145)
TRIGL SERPL-MCNC: 94 MG/DL (ref 30–150)
WBC # BLD AUTO: 7.81 K/UL (ref 3.9–12.7)

## 2025-02-07 PROCEDURE — 36415 COLL VENOUS BLD VENIPUNCTURE: CPT | Performed by: PHYSICIAN ASSISTANT

## 2025-02-07 PROCEDURE — 80053 COMPREHEN METABOLIC PANEL: CPT | Performed by: PHYSICIAN ASSISTANT

## 2025-02-07 PROCEDURE — 83036 HEMOGLOBIN GLYCOSYLATED A1C: CPT | Performed by: PHYSICIAN ASSISTANT

## 2025-02-07 PROCEDURE — 85025 COMPLETE CBC W/AUTO DIFF WBC: CPT | Performed by: PHYSICIAN ASSISTANT

## 2025-02-07 PROCEDURE — 83735 ASSAY OF MAGNESIUM: CPT | Performed by: NURSE PRACTITIONER

## 2025-02-07 PROCEDURE — 80061 LIPID PANEL: CPT | Performed by: PHYSICIAN ASSISTANT

## 2025-02-13 ENCOUNTER — OFFICE VISIT (OUTPATIENT)
Dept: INTERNAL MEDICINE | Facility: CLINIC | Age: 55
End: 2025-02-13
Payer: COMMERCIAL

## 2025-02-13 VITALS
TEMPERATURE: 98 F | HEART RATE: 94 BPM | WEIGHT: 173.06 LBS | SYSTOLIC BLOOD PRESSURE: 122 MMHG | BODY MASS INDEX: 28.83 KG/M2 | OXYGEN SATURATION: 95 % | DIASTOLIC BLOOD PRESSURE: 74 MMHG | HEIGHT: 65 IN

## 2025-02-13 DIAGNOSIS — R10.9 ABDOMINAL PAIN, UNSPECIFIED ABDOMINAL LOCATION: ICD-10-CM

## 2025-02-13 DIAGNOSIS — I10 HYPERTENSION GOAL BP (BLOOD PRESSURE) < 130/80: Primary | ICD-10-CM

## 2025-02-13 DIAGNOSIS — N18.31 STAGE 3A CHRONIC KIDNEY DISEASE: ICD-10-CM

## 2025-02-13 DIAGNOSIS — E11.65 UNCONTROLLED TYPE 2 DIABETES MELLITUS WITH HYPERGLYCEMIA: ICD-10-CM

## 2025-02-13 NOTE — Clinical Note
"Steven Masters,  I saw patient today for routine follow up.  Current A1c 10.2%, worsened from prior  Current meds: ozempic .5mg, jardiance 25mg, glimepiride 4mg bid, toujeo 18 units Does not want to lose any more weight  Metformin caused itching, rash Followed by Diabetes, [place] Jan 2025  She is understandably frustrated that additional meds keep being added but she always is having "bad" visits with continued uncontrolled diabetes   Considered change from ozempic to mounjaro for better sugar control, but she is concerned about weight loss with GLP 1  She is agreeable to seeing dietician, referral placed   Do you have any additional recommendations as far as diabetic control?  Patient states she is ready to give up because she feels she is doing everything she is told and it has not resulted in any improvement.  Her next visit with your is April.   Thanks for your help,  Liliane"

## 2025-02-13 NOTE — PROGRESS NOTES
"Subjective:      Patient ID: Mildred Meier is a 54 y.o. female.    Chief Complaint: Follow-up    Patient is known to me, being seen today for 3mth f/u.     HTN- losartan-HCTZ 100-12.5mg   HLD- on statin therapy   DM- A1c 10.2%, ozempic .5mg, jardiance 25mg, glimepiride 4mg bid, toujeo 18 units  Does not want to lose any more weight   Metformin caused itching, rash  Followed by Diabetes, [place] Jan 2025     Not consistent with exercise, reports diet is healthy (salmon, tuna, salads)  Tries to stay away from breads/pastas, occasionally w treats and alcohol but rare     Labs recently completed, diabetes worsened, kidney function decreased but stable     Last visit Nov 2024 w myself.       Review of Systems   Constitutional:  Negative for chills, diaphoresis and fever.   HENT:  Negative for congestion, rhinorrhea and sore throat.    Respiratory:  Negative for cough, shortness of breath and wheezing.    Gastrointestinal:  Positive for abdominal pain (lower, radiates to R back,  intermittent, denies trauma/injury). Negative for blood in stool, constipation, diarrhea, nausea and vomiting.        Last BM yesterday, normal   Genitourinary:  Negative for dysuria, flank pain and hematuria.   Skin:  Negative for rash.   Neurological:  Negative for dizziness, light-headedness and headaches.       Objective:   /74   Pulse 94   Temp 98 °F (36.7 °C) (Tympanic)   Ht 5' 5" (1.651 m)   Wt 78.5 kg (173 lb 1 oz)   SpO2 95%   BMI 28.80 kg/m²   Physical Exam  Constitutional:       General: She is not in acute distress.     Appearance: Normal appearance. She is well-developed. She is not ill-appearing.   HENT:      Head: Normocephalic and atraumatic.   Cardiovascular:      Rate and Rhythm: Normal rate and regular rhythm.      Heart sounds: Normal heart sounds. No murmur heard.  Pulmonary:      Effort: Pulmonary effort is normal. No respiratory distress.      Breath sounds: Normal breath sounds. No decreased breath sounds. " "  Abdominal:      General: Bowel sounds are normal.      Palpations: Abdomen is soft.      Tenderness: There is no abdominal tenderness.   Musculoskeletal:      Right lower leg: No edema.      Left lower leg: No edema.   Skin:     General: Skin is warm and dry.      Findings: No rash.   Psychiatric:         Speech: Speech normal.         Behavior: Behavior normal.         Thought Content: Thought content normal.       Assessment:      1. Hypertension goal BP (blood pressure) < 130/80    2. Uncontrolled type 2 diabetes mellitus with hyperglycemia    3. Stage 3a chronic kidney disease    4. Abdominal pain, unspecified abdominal location       Plan:   Hypertension goal BP (blood pressure) < 130/80    Uncontrolled type 2 diabetes mellitus with hyperglycemia  -     Ambulatory referral/consult to Diabetes Education; Future; Expected date: 02/20/2025    Stage 3a chronic kidney disease    Abdominal pain, unspecified abdominal location  -     X-Ray Abdomen Flat And Erect; Future; Expected date: 02/13/2025  -     Urinalysis, Reflex to Urine Culture Urine, Clean Catch; Future; Expected date: 02/13/2025      Ensure adequate hydration and avoid NSAIDs     Considered change from ozempic to mounjaro for better sugar control, she is concerned about weight loss with GLP 1     She is understandably frustrated that additional meds keep being added but she always is having "bad" visits with continued uncontrolled diabetes     I will keep her meds as is for now, but will send a message to Diabetes for additional recs   She is agreeable to seeing dietician, referral placed     6mth f/u PCP     "

## 2025-02-17 ENCOUNTER — HOSPITAL ENCOUNTER (OUTPATIENT)
Dept: RADIOLOGY | Facility: HOSPITAL | Age: 55
Discharge: HOME | End: 2025-02-17
Attending: PHYSICIAN ASSISTANT
Payer: COMMERCIAL

## 2025-02-17 ENCOUNTER — RESULTS FOLLOW-UP (OUTPATIENT)
Dept: INTERNAL MEDICINE | Facility: CLINIC | Age: 55
End: 2025-02-17

## 2025-02-17 DIAGNOSIS — R10.9 ABDOMINAL PAIN, UNSPECIFIED ABDOMINAL LOCATION: ICD-10-CM

## 2025-02-17 DIAGNOSIS — E78.5 HYPERLIPIDEMIA LDL GOAL <70: ICD-10-CM

## 2025-02-17 PROCEDURE — 74019 RADEX ABDOMEN 2 VIEWS: CPT | Mod: TC

## 2025-02-17 RX ORDER — ATORVASTATIN CALCIUM 40 MG/1
40 TABLET, FILM COATED ORAL NIGHTLY
Qty: 90 TABLET | Refills: 1 | Status: SHIPPED | OUTPATIENT
Start: 2025-02-17

## 2025-02-17 NOTE — TELEPHONE ENCOUNTER
No care due was identified.  [facility] Embedded Care Due Messages. Reference number: [id number].   2/17/2025 11:24:38 AM CST

## 2025-02-17 NOTE — TELEPHONE ENCOUNTER
----- Message from Gudelia sent at 2/17/2025  8:31 AM CST -----  Contact: Patient, [phone]  Requesting an RX refill or new RX.Is this a refill or new RX: RefillRX name and strength (copy/paste from chart):  atorvastatin (LIPITOR) 40 MG tabletIs this a 30 day or 90 day RX: 90Pharmacy name and phone # (copy/paste from chart):  [facility] [place] [id number]: [phone] Fax: [phone] [address] [facility] [id number] [facility] [address] [address]Ffvcw: [phone] Fax: [phone] The doctors have asked that we provide their patients with the following 2 reminders -- prescription refills can take up to 72 hours, and a friendly reminder that in the future you can use your [facility] account to request refills: Yes

## 2025-02-18 ENCOUNTER — PATIENT MESSAGE (OUTPATIENT)
Dept: INTERNAL MEDICINE | Facility: CLINIC | Age: 55
End: 2025-02-18
Payer: COMMERCIAL

## 2025-02-18 NOTE — TELEPHONE ENCOUNTER
Refill Decision Note   Mildred Meier  is requesting a refill authorization.  Brief Assessment and Rationale for Refill:  Approve     Medication Therapy Plan:        Comments:     Note composed:10:01 PM 02/17/2025

## 2025-02-24 ENCOUNTER — PATIENT MESSAGE (OUTPATIENT)
Dept: INTERNAL MEDICINE | Facility: CLINIC | Age: 55
End: 2025-02-24
Payer: COMMERCIAL

## 2025-03-03 ENCOUNTER — TELEPHONE (OUTPATIENT)
Dept: DIABETES | Facility: CLINIC | Age: 55
End: 2025-03-03
Payer: COMMERCIAL

## 2025-03-10 ENCOUNTER — OFFICE VISIT (OUTPATIENT)
Dept: DERMATOLOGY | Facility: CLINIC | Age: 55
End: 2025-03-10
Payer: COMMERCIAL

## 2025-03-10 DIAGNOSIS — L30.9 ECZEMA, UNSPECIFIED TYPE: Primary | ICD-10-CM

## 2025-03-10 PROCEDURE — 99999 PR PBB SHADOW E&M-EST. PATIENT-LVL IV: CPT | Mod: PBBFAC,,, | Performed by: PHYSICIAN ASSISTANT

## 2025-03-10 PROCEDURE — 3046F HEMOGLOBIN A1C LEVEL >9.0%: CPT | Mod: CPTII,S$GLB,, | Performed by: PHYSICIAN ASSISTANT

## 2025-03-10 PROCEDURE — 3072F LOW RISK FOR RETINOPATHY: CPT | Mod: CPTII,S$GLB,, | Performed by: PHYSICIAN ASSISTANT

## 2025-03-10 PROCEDURE — 1159F MED LIST DOCD IN RCRD: CPT | Mod: CPTII,S$GLB,, | Performed by: PHYSICIAN ASSISTANT

## 2025-03-10 PROCEDURE — 1160F RVW MEDS BY RX/DR IN RCRD: CPT | Mod: CPTII,S$GLB,, | Performed by: PHYSICIAN ASSISTANT

## 2025-03-10 PROCEDURE — 99214 OFFICE O/P EST MOD 30 MIN: CPT | Mod: S$GLB,,, | Performed by: PHYSICIAN ASSISTANT

## 2025-03-10 RX ORDER — PIMECROLIMUS 10 MG/G
CREAM TOPICAL 2 TIMES DAILY
Qty: 60 G | Refills: 1 | Status: SHIPPED | OUTPATIENT
Start: 2025-03-10

## 2025-03-10 NOTE — PROGRESS NOTES
Subjective:      Patient ID:  Mildred Meier is a 54 y.o. female who presents for   Chief Complaint   Patient presents with    Rash     C/o rash on shoulder and check area. C/o skin discoloration.    Follow-up     Hx presumed ACD of lower face and neck after using a new tumeric black charcoal soap, last seen 1/14/25. Here for f/u. Notes interval improvement in rash, but now with new areas of dryness and darkness. Denies associated itching, but does note some h/o redness at one point. Following fragrance free regimen and emollients.  New areas of discoloration of lip corners, neck, and anterior shoulders.     Has been  using triluma q 6 weeks on and 6 weeks off.         Review of Systems    Objective:   Physical Exam   Constitutional: She appears well-developed and well-nourished. No distress.   Neurological: She is alert and oriented to person, place, and time. She is not disoriented.   Psychiatric: She has a normal mood and affect.   Skin:   Areas Examined (abnormalities noted in diagram):   Head / Face Inspection Performed  Neck Inspection Performed  Chest / Axilla Inspection Performed  Abdomen Inspection Performed  Back Inspection Performed  RUE Inspected  LUE Inspection Performed  RLE Inspected  LLE Inspection Performed                 Diagram Legend     Erythematous scaling macule/papule c/w actinic keratosis       Vascular papule c/w angioma      Pigmented verrucoid papule/plaque c/w seborrheic keratosis      Yellow umbilicated papule c/w sebaceous hyperplasia      Irregularly shaped tan macule c/w lentigo     1-2 mm smooth white papules consistent with Milia      Movable subcutaneous cyst with punctum c/w epidermal inclusion cyst      Subcutaneous movable cyst c/w pilar cyst      Firm pink to brown papule c/w dermatofibroma      Pedunculated fleshy papule(s) c/w skin tag(s)      Evenly pigmented macule c/w junctional nevus     Mildly variegated pigmented, slightly irregular-bordered macule c/w mildly  atypical nevus      Flesh colored to evenly pigmented papule c/w intradermal nevus       Pink pearly papule/plaque c/w basal cell carcinoma      Erythematous hyperkeratotic cursted plaque c/w SCC      Surgical scar with no sign of skin cancer recurrence      Open and closed comedones      Inflammatory papules and pustules      Verrucoid papule consistent consistent with wart     Erythematous eczematous patches and plaques     Dystrophic onycholytic nail with subungual debris c/w onychomycosis     Umbilicated papule    Erythematous-base heme-crusted tan verrucoid plaque consistent with inflamed seborrheic keratosis     Erythematous Silvery Scaling Plaque c/w Psoriasis     See annotation      Assessment / Plan:        Eczema, unspecified type  -     pimecrolimus (ELIDEL) 1 % cream; Apply topically 2 (two) times daily as needed for eczema flares. Non-steroid med.  Dispense: 60 g; Refill: 1  Ddx: eczematous vs. EDP vs. Less likely exogenous ochronosis vs. Other  Trial of above rx prn. Strict sensitive skin care and avoidance of fragrance encouraged. Advised d/c triluma. Would reconsider alternative fade rx in future without HQ as risk of exogenous ochronosis w/prolonged application. Encouraged daily spf 30+ mineral based for now. Cetaphil Sheer Mineral discussed.            No follow-ups on file.

## 2025-04-03 ENCOUNTER — OFFICE VISIT (OUTPATIENT)
Dept: OPHTHALMOLOGY | Facility: CLINIC | Age: 55
End: 2025-04-03
Payer: COMMERCIAL

## 2025-04-03 DIAGNOSIS — H52.222 REGULAR ASTIGMATISM OF LEFT EYE: ICD-10-CM

## 2025-04-03 DIAGNOSIS — E11.9 TYPE 2 DIABETES MELLITUS WITHOUT RETINOPATHY: Primary | ICD-10-CM

## 2025-04-03 DIAGNOSIS — H25.13 NUCLEAR SCLEROSIS, BILATERAL: ICD-10-CM

## 2025-04-03 DIAGNOSIS — E11.36 DIABETIC CATARACT OF BOTH EYES: ICD-10-CM

## 2025-04-03 DIAGNOSIS — H52.13 MYOPIA WITH PRESBYOPIA, BILATERAL: ICD-10-CM

## 2025-04-03 DIAGNOSIS — H25.011 CORTICAL AGE-RELATED CATARACT OF RIGHT EYE: ICD-10-CM

## 2025-04-03 DIAGNOSIS — H25.043 POSTERIOR SUBCAPSULAR AGE-RELATED CATARACT OF BOTH EYES: ICD-10-CM

## 2025-04-03 DIAGNOSIS — H52.4 MYOPIA WITH PRESBYOPIA, BILATERAL: ICD-10-CM

## 2025-04-03 PROCEDURE — 99999 PR PBB SHADOW E&M-EST. PATIENT-LVL III: CPT | Mod: PBBFAC,,, | Performed by: OPTOMETRIST

## 2025-04-03 NOTE — PROGRESS NOTES
HPI     Diabetic Eye Exam            Comments: Pt presents for an annual diabetic eye exam.   Most recent specs broken; slight vision decline. Needing better reading   specs.   Denies flashes/ floaters/ headaches at this time.     Lab Results       Component                Value               Date                       HGBA1C                   10.2 (H)            02/07/2025                    Comments    1. DM dx 2012          Last edited by Salma Khan on 4/3/2025  2:33 PM.            Assessment /Plan     For exam results, see Encounter Report.    Type 2 diabetes mellitus without retinopathy  There was no diabetic retinopathy present in either eye today.   Recommended that pt continue care with PCP and/or specialists regarding diabetes.  Follow-up dilated eye exam recommended in 12 months, sooner with any vision changes or new concerns.    Nuclear sclerosis, bilateral  Posterior subcapsular age-related cataract of both eyes  Cortical age-related cataract of right eye  Diabetic cataract of both eyes  Cataracts not significantly affecting activities of daily living and therefore surgery is not indicated at this time.   Will continue to monitor over the next 12 months. Pt to call or RTC with any significant change in vision prior to next visit.     Myopia with presbyopia, bilateral  Regular astigmatism of left eye  Eyeglass Final Rx       Eyeglass Final Rx         Sphere Cylinder Axis Add    Right -0.25   +2.50    Left -0.75 +0.75 015 +2.50      Expiration Date: 4/3/2026                  Normal gOCT today with nice, symmetric GCL OU      RTC 1 yr for dilated eye exam or PRN if any problems.   Discussed above and answered questions.

## 2025-04-10 ENCOUNTER — PATIENT OUTREACH (OUTPATIENT)
Dept: ADMINISTRATIVE | Facility: HOSPITAL | Age: 55
End: 2025-04-10
Payer: COMMERCIAL

## 2025-04-10 NOTE — PROGRESS NOTES
VBHM Score: 0     Patient is not due for any topics at this time.                 Pt has follow up scheduled, 5/08/25.  Spoke with pt and scheduled labs prior, 5/06/25.

## 2025-04-14 NOTE — TELEPHONE ENCOUNTER
Refill Routing Note   Medication(s) are not appropriate for processing by [facility] for the following reason(s):        No active prescription written by provider    [facility] action(s):  Defer             Appointments  past 12m or future 3m with PCP    Date Provider   Last Visit   8/8/2024 Liss Hudson, DO   Next Visit   5/8/2025 Liss Hudson DO   ED visits in past 90 days: 0        Note composed:4:04 PM 04/14/2025

## 2025-04-14 NOTE — TELEPHONE ENCOUNTER
No care due was identified.  [facility] Embedded Care Due Messages. Reference number: [id number].   4/14/2025 12:09:27 PM CDT

## 2025-04-14 NOTE — TELEPHONE ENCOUNTER
----- Message from Lorin sent at 4/14/2025 12:15 PM CDT -----  Contact: [phone]  Requesting an RX refill or new RX.Is this a refill or new RX: refillRX name and strength (copy/paste from chart):  omeprazole (PRILOSEC) 40 MG capsuleIs this a 30 day or 90 day RX: 30Pharmacy name and phone # (copy/paste from chart):  [facility] [address] [address]Sdpxy: [phone] Fax: [phone] The doctors have asked that we provide their patients with the following 2 reminders -- prescription refills can take up to 72 hours, and a friendly reminder that in the future you can use your [facility] account to request refills: yes she states she is completely out

## 2025-04-16 RX ORDER — OMEPRAZOLE 40 MG/1
CAPSULE, DELAYED RELEASE ORAL
Qty: 90 CAPSULE | Refills: 1 | Status: SHIPPED | OUTPATIENT
Start: 2025-04-16

## 2025-05-08 ENCOUNTER — OFFICE VISIT (OUTPATIENT)
Dept: INTERNAL MEDICINE | Facility: CLINIC | Age: 55
End: 2025-05-08
Payer: COMMERCIAL

## 2025-05-08 VITALS
HEIGHT: 65 IN | OXYGEN SATURATION: 98 % | DIASTOLIC BLOOD PRESSURE: 72 MMHG | BODY MASS INDEX: 29.42 KG/M2 | HEART RATE: 85 BPM | SYSTOLIC BLOOD PRESSURE: 122 MMHG | TEMPERATURE: 97 F | WEIGHT: 176.56 LBS

## 2025-05-08 DIAGNOSIS — E78.5 HYPERLIPIDEMIA LDL GOAL <70: ICD-10-CM

## 2025-05-08 DIAGNOSIS — R51.9 INTERMITTENT HEADACHE: ICD-10-CM

## 2025-05-08 DIAGNOSIS — K21.9 CHRONIC GERD: ICD-10-CM

## 2025-05-08 DIAGNOSIS — E11.65 UNCONTROLLED TYPE 2 DIABETES MELLITUS WITH HYPERGLYCEMIA, WITH LONG-TERM CURRENT USE OF INSULIN: Primary | ICD-10-CM

## 2025-05-08 DIAGNOSIS — Z79.4 UNCONTROLLED TYPE 2 DIABETES MELLITUS WITH HYPERGLYCEMIA, WITH LONG-TERM CURRENT USE OF INSULIN: Primary | ICD-10-CM

## 2025-05-08 DIAGNOSIS — I10 HYPERTENSION GOAL BP (BLOOD PRESSURE) < 130/80: ICD-10-CM

## 2025-05-08 PROCEDURE — 3046F HEMOGLOBIN A1C LEVEL >9.0%: CPT | Mod: CPTII,S$GLB,, | Performed by: INTERNAL MEDICINE

## 2025-05-08 PROCEDURE — 3074F SYST BP LT 130 MM HG: CPT | Mod: CPTII,S$GLB,, | Performed by: INTERNAL MEDICINE

## 2025-05-08 PROCEDURE — G2211 COMPLEX E/M VISIT ADD ON: HCPCS | Mod: S$GLB,,, | Performed by: INTERNAL MEDICINE

## 2025-05-08 PROCEDURE — 3008F BODY MASS INDEX DOCD: CPT | Mod: CPTII,S$GLB,, | Performed by: INTERNAL MEDICINE

## 2025-05-08 PROCEDURE — 1160F RVW MEDS BY RX/DR IN RCRD: CPT | Mod: CPTII,S$GLB,, | Performed by: INTERNAL MEDICINE

## 2025-05-08 PROCEDURE — 1159F MED LIST DOCD IN RCRD: CPT | Mod: CPTII,S$GLB,, | Performed by: INTERNAL MEDICINE

## 2025-05-08 PROCEDURE — 99214 OFFICE O/P EST MOD 30 MIN: CPT | Mod: S$GLB,,, | Performed by: INTERNAL MEDICINE

## 2025-05-08 PROCEDURE — 3078F DIAST BP <80 MM HG: CPT | Mod: CPTII,S$GLB,, | Performed by: INTERNAL MEDICINE

## 2025-05-08 PROCEDURE — 99999 PR PBB SHADOW E&M-EST. PATIENT-LVL V: CPT | Mod: PBBFAC,,, | Performed by: INTERNAL MEDICINE

## 2025-05-08 RX ORDER — BUTALBITAL, ACETAMINOPHEN AND CAFFEINE 50; 325; 40 MG/1; MG/1; MG/1
1 TABLET ORAL EVERY 6 HOURS PRN
Qty: 30 TABLET | Refills: 1 | Status: SHIPPED | OUTPATIENT
Start: 2025-05-08

## 2025-05-08 NOTE — PROGRESS NOTES
Mildred Meier  54 y.o. Black or  female  [id number]    Chief Complaint:  Chief Complaint   Patient presents with    Follow-up     6mnth f/u       History of Present Illness:  History of Present Illness    CHIEF COMPLAINT:  Ms. Meier presents today for follow-up of diabetes management    DIABETES:  A1C was 10.2 in February. Her finger stick glucose checks when symptomatic range from 138-190 in the last three months. She discontinued Ozempic approximately one month ago due to side pain. She currently takes insulin 18 units daily, glimepiride 4 mg BID and Jardiance 25 mg daily.    CARDIOVASCULAR:  She reports excellent blood pressure control with home monitoring.    GASTROINTESTINAL:  She experiences nausea when not taking acid reflux medication. Bowel movements are normal.       HEADACHES AND VISION:  She experiences mild, tolerable headaches associated with morning medication intake and occasionally at other times that may persist throughout the day, but not daily. She obtained new glasses 2 weeks ago and reports mild blurriness that does not require immediate use of glasses. She requires glasses for computer work and reading small text.    Review of Systems   Eyes:  Positive for blurred vision.   Respiratory:  Negative for shortness of breath.    Cardiovascular:  Negative for chest pain and leg swelling.   Gastrointestinal:  Positive for heartburn. Negative for blood in stool, constipation, nausea and vomiting.   Neurological:  Positive for headaches.       Laboratory:  Lab Results   Component Value Date    WBC 7.81 02/07/2025    HGB 14.5 02/07/2025    HCT 44.4 02/07/2025     02/07/2025    CHOL 129 02/07/2025    TRIG 94 02/07/2025    HDL 49 02/07/2025    ALT 32 02/07/2025    AST 20 02/07/2025     02/07/2025    K 4.2 02/07/2025     02/07/2025    CREATININE 1.3 02/07/2025    BUN 20 02/07/2025    CO2 23 02/07/2025    TSH 1.02 08/29/2023    HGBA1C 10.2 (H) 02/07/2025     Lab Results    Component Value Date    LDLCALC 61.2 (L) 02/07/2025       History:  Past Medical History:   Diagnosis Date    Diabetes mellitus, type 2     GERD (gastroesophageal reflux disease)     Hyperlipidemia     Hypertension        Medications:  Medications Ordered Prior to Encounter[1]    Allergies:  Review of patient's allergies indicates:   Allergen Reactions    Ace inhibitors      Cough    Metformin Itching and Rash       Exam:  Vitals:    05/08/25 1544   BP: 122/72   Pulse: 85   Temp: 96.5 °F (35.8 °C)     Weight: 80.1 kg (176 lb 9.4 oz)   Body mass index is 29.39 kg/m².      Physical Exam    Vitals: Reviewed. Normal blood pressure.  Constitutional: No acute distress. Well-developed. Not ill-appearing.  Eyes: No scleral icterus.  Cardiovascular: Normal rate and regular rhythm. Normal heart sounds.  Pulmonary: Pulmonary effort is normal. No respiratory distress. Normal breath sounds.  Abdomen: Soft. Nontender. Nondistended. Normoactive bowel sounds.  Extremities: No edema.  Skin: Warm. Dry.  Neurological: Alert and oriented to person, place, and time.  Psychiatric: Behavior normal.         Assessment:  The primary encounter diagnosis was Uncontrolled type 2 diabetes mellitus with hyperglycemia, with long-term current use of insulin. Diagnoses of Hypertension goal BP (blood pressure) < 130/80, Hyperlipidemia LDL goal <70, Chronic GERD, and Intermittent headache were also pertinent to this visit.    Assessment & Plan    TYPE 2 DIABETES MELLITUS:  - Blood sugars have been in the 100s, with readings of 186, 138, and a maximum of 190 in the last 3 months.  - Ms. Meier has not been checking blood sugar twice daily as recommended, only when feeling symptomatic.  - A1C level of 10.2 from February indicates poor glycemic control (goal is <7).  - Ms. Meier missed the last appointment with diabetes specialist Sonam Dash and has not rescheduled.  - Current regimen includes Glyburide twice daily, Jardiance at maximum dose,  and insulin 18 units.  - Ms. Meier discontinued Ozempic due to side effects and medication depletion.  - Ordered fasting labs including A1C to guide treatment decisions.  - Will evaluate treatment options after reviewing new lab results, including potential insulin adjustment or alternatives to Ozempic.  - Continuing insulin therapy at current dose for now.    HYPERTENSION:  - Blood pressure readings appear adequate according to chart review.  - Ms. Meier reports blood pressure has been excellent with no issues.    HYPERLIPIDEMIA:   - Check lipid panel    GASTROESOPHAGEAL REFLUX DISEASE:  - Ms. Meier experiences episodes of nausea when not taking acid reflux medication.  - Advised to continue taking prescribed acid reflux medication to manage symptoms.    HEADACHE:  - Ms. Meier experiences occasional headaches, sometimes occurring after taking morning medication, but not daily.  - Continuing current as-needed caffeine pill regimen which is appropriate for these occasional headaches.    FOLLOW-UP:  - Scheduled follow-up visit after completion of fasting labs to review results and adjust treatment plan as needed.                        [1]   Current Outpatient Medications on File Prior to Visit   Medication Sig Dispense Refill    aspirin (ECOTRIN) 81 MG EC tablet Take 81 mg by mouth.      atorvastatin (LIPITOR) 40 MG tablet Take 1 tablet by mouth daily at bedtime for cholesterol 90 tablet 1    blood sugar diagnostic Strp To check BG two times daily, to use with insurance preferred meter 200 each 1    blood-glucose meter kit To check BG two times daily, to use with insurance preferred meter 1 each 0    fluocinolone-hydroq.-tretinoin (TRI-ARIANNA) 0.01-4-0.05 % Crea Apply 1 application  topically every evening. Use for 6 weeks. Hold for 4 weeks. Resume for another 6 weeks 30 g 0    glimepiride (AMARYL) 4 MG tablet Take 1 tablet (4 mg total) by mouth 2 (two) times daily with meals. 90 tablet 3    hydroquinone 4 %  "Crea Apply to dark spots once daily. Use with sunscreen if outdoors 28 g 1    insulin glargine, TOUJEO, (TOUJEO SOLOSTAR U-300 INSULIN) 300 unit/mL (1.5 mL) InPn pen Inject 15 Units into the skin once daily. 6 Pen 1    ivermectin (SOOLANTRA) 1 % Crea APPLY TO AFFECTED AREA OF FACE EVERY NIGHT AT BEDTIME AS DIRECTED **EXTERNAL USE ONLY** 45 g 3    lancets 28 gauge Misc To check BG two times daily, to use with insurance preferred meter 200 each 1    losartan-hydrochlorothiazide 100-12.5 mg (HYZAAR) 100-12.5 mg Tab Take 1 tablet by mouth once daily for blood pressure 90 tablet 3    multivitamin (THERAGRAN) per tablet Take 1 tablet by mouth once daily.      omeprazole (PRILOSEC) 40 MG capsule Take 1 capsule by mouth once daily. For reflux 90 capsule 1    pen needle, diabetic (PEN NEEDLE) 32 gauge x 5/32" Ndle Use daily with lantus 100 each 5    pimecrolimus (ELIDEL) 1 % cream Apply topically 2 (two) times daily as needed for eczema flares. Non-steroid med. 60 g 1    semaglutide (OZEMPIC) 0.25 mg or 0.5 mg (2 mg/3 mL) pen injector Inject 0.5 mg into the skin every 7 days. 3 mL 3    triamcinolone acetonide 0.025% (KENALOG) 0.025 % cream Apply topically 2 (two) times daily as needed for rash of neck. Steroid- limit to 2 weeks then taper. 30 g 0    [DISCONTINUED] butalbital-acetaminophen-caffeine -40 mg (FIORICET, ESGIC) -40 mg per tablet Take 1 tablet by mouth every 6 (six) hours as needed for Headaches. 30 tablet 1    [DISCONTINUED] empagliflozin (JARDIANCE) 25 mg tablet Take 1 tablet by mouth in the morning for 180 days. For diabets 90 tablet 1     No current facility-administered medications on file prior to visit.     "

## 2025-05-09 ENCOUNTER — LAB VISIT (OUTPATIENT)
Dept: LAB | Facility: HOSPITAL | Age: 55
End: 2025-05-09
Attending: INTERNAL MEDICINE
Payer: COMMERCIAL

## 2025-05-09 DIAGNOSIS — E78.5 HYPERLIPIDEMIA LDL GOAL <70: ICD-10-CM

## 2025-05-09 DIAGNOSIS — I10 HYPERTENSION GOAL BP (BLOOD PRESSURE) < 130/80: ICD-10-CM

## 2025-05-09 DIAGNOSIS — E11.65 UNCONTROLLED TYPE 2 DIABETES MELLITUS WITH HYPERGLYCEMIA, WITH LONG-TERM CURRENT USE OF INSULIN: ICD-10-CM

## 2025-05-09 DIAGNOSIS — Z79.4 UNCONTROLLED TYPE 2 DIABETES MELLITUS WITH HYPERGLYCEMIA, WITH LONG-TERM CURRENT USE OF INSULIN: ICD-10-CM

## 2025-05-09 LAB
ALBUMIN SERPL BCP-MCNC: 3.9 G/DL (ref 3.5–5.2)
ALP SERPL-CCNC: 98 UNIT/L (ref 40–150)
ALT SERPL W/O P-5'-P-CCNC: 32 UNIT/L (ref 10–44)
ANION GAP (OHS): 11 MMOL/L (ref 8–16)
AST SERPL-CCNC: 22 UNIT/L (ref 11–45)
BILIRUB SERPL-MCNC: 0.3 MG/DL (ref 0.1–1)
BUN SERPL-MCNC: 23 MG/DL (ref 6–20)
CALCIUM SERPL-MCNC: 9.4 MG/DL (ref 8.7–10.5)
CHLORIDE SERPL-SCNC: 102 MMOL/L (ref 95–110)
CHOLEST SERPL-MCNC: 146 MG/DL (ref 120–199)
CHOLEST/HDLC SERPL: 3.4 {RATIO} (ref 2–5)
CO2 SERPL-SCNC: 27 MMOL/L (ref 23–29)
CREAT SERPL-MCNC: 1.3 MG/DL (ref 0.5–1.4)
EAG (OHS): 246 MG/DL (ref 68–131)
GFR SERPLBLD CREATININE-BSD FMLA CKD-EPI: 49 ML/MIN/1.73/M2
GLUCOSE SERPL-MCNC: 252 MG/DL (ref 70–110)
HBA1C MFR BLD: 10.2 % (ref 4–5.6)
HDLC SERPL-MCNC: 43 MG/DL (ref 40–75)
HDLC SERPL: 29.5 % (ref 20–50)
LDLC SERPL CALC-MCNC: 75.2 MG/DL (ref 63–159)
NONHDLC SERPL-MCNC: 103 MG/DL
POTASSIUM SERPL-SCNC: 4.3 MMOL/L (ref 3.5–5.1)
PROT SERPL-MCNC: 7.7 GM/DL (ref 6–8.4)
SODIUM SERPL-SCNC: 140 MMOL/L (ref 136–145)
TRIGL SERPL-MCNC: 139 MG/DL (ref 30–150)

## 2025-05-09 PROCEDURE — 83718 ASSAY OF LIPOPROTEIN: CPT

## 2025-05-09 PROCEDURE — 83036 HEMOGLOBIN GLYCOSYLATED A1C: CPT

## 2025-05-09 PROCEDURE — 36415 COLL VENOUS BLD VENIPUNCTURE: CPT

## 2025-05-09 PROCEDURE — 84450 TRANSFERASE (AST) (SGOT): CPT

## 2025-05-15 ENCOUNTER — TELEPHONE (OUTPATIENT)
Dept: INTERNAL MEDICINE | Facility: CLINIC | Age: 55
End: 2025-05-15
Payer: COMMERCIAL

## 2025-05-15 ENCOUNTER — RESULTS FOLLOW-UP (OUTPATIENT)
Dept: INTERNAL MEDICINE | Facility: CLINIC | Age: 55
End: 2025-05-15

## 2025-05-15 DIAGNOSIS — E11.65 UNCONTROLLED TYPE 2 DIABETES MELLITUS WITH HYPERGLYCEMIA, WITH LONG-TERM CURRENT USE OF INSULIN: ICD-10-CM

## 2025-05-15 DIAGNOSIS — Z79.4 UNCONTROLLED TYPE 2 DIABETES MELLITUS WITH HYPERGLYCEMIA, WITH LONG-TERM CURRENT USE OF INSULIN: ICD-10-CM

## 2025-05-15 RX ORDER — INSULIN GLARGINE 300 [IU]/ML
22 INJECTION, SOLUTION SUBCUTANEOUS DAILY
Qty: 6 PEN | Refills: 1 | Status: SHIPPED | OUTPATIENT
Start: 2025-05-15

## 2025-06-09 RX ORDER — TRIAMCINOLONE ACETONIDE 0.25 MG/G
CREAM TOPICAL 2 TIMES DAILY
Qty: 30 G | Refills: 0 | Status: SHIPPED | OUTPATIENT
Start: 2025-06-09

## 2025-08-13 DIAGNOSIS — E11.9 TYPE 2 DIABETES MELLITUS WITHOUT COMPLICATION: ICD-10-CM

## 2025-08-14 ENCOUNTER — OFFICE VISIT (OUTPATIENT)
Dept: INTERNAL MEDICINE | Facility: CLINIC | Age: 55
End: 2025-08-14
Payer: COMMERCIAL

## 2025-08-14 VITALS — SYSTOLIC BLOOD PRESSURE: 127 MMHG | DIASTOLIC BLOOD PRESSURE: 69 MMHG

## 2025-08-14 DIAGNOSIS — E11.65 UNCONTROLLED TYPE 2 DIABETES MELLITUS WITH HYPERGLYCEMIA: Primary | ICD-10-CM

## 2025-08-14 DIAGNOSIS — N18.31 STAGE 3A CHRONIC KIDNEY DISEASE: ICD-10-CM

## 2025-08-14 DIAGNOSIS — I10 HYPERTENSION GOAL BP (BLOOD PRESSURE) < 130/80: ICD-10-CM

## 2025-08-14 PROCEDURE — 1160F RVW MEDS BY RX/DR IN RCRD: CPT | Mod: CPTII,95,, | Performed by: PHYSICIAN ASSISTANT

## 2025-08-14 PROCEDURE — 98004 SYNCH AUDIO-VIDEO EST SF 10: CPT | Mod: 95,,, | Performed by: PHYSICIAN ASSISTANT

## 2025-08-14 PROCEDURE — 3074F SYST BP LT 130 MM HG: CPT | Mod: CPTII,95,, | Performed by: PHYSICIAN ASSISTANT

## 2025-08-14 PROCEDURE — 1159F MED LIST DOCD IN RCRD: CPT | Mod: CPTII,95,, | Performed by: PHYSICIAN ASSISTANT

## 2025-08-14 PROCEDURE — 3046F HEMOGLOBIN A1C LEVEL >9.0%: CPT | Mod: CPTII,95,, | Performed by: PHYSICIAN ASSISTANT

## 2025-08-14 PROCEDURE — 3078F DIAST BP <80 MM HG: CPT | Mod: CPTII,95,, | Performed by: PHYSICIAN ASSISTANT

## 2025-08-27 DIAGNOSIS — E78.5 HYPERLIPIDEMIA LDL GOAL <70: ICD-10-CM

## 2025-08-27 RX ORDER — ATORVASTATIN CALCIUM 40 MG/1
40 TABLET, FILM COATED ORAL NIGHTLY
Qty: 90 TABLET | Refills: 2 | Status: SHIPPED | OUTPATIENT
Start: 2025-08-27

## 2025-08-28 RX ORDER — OMEPRAZOLE 40 MG/1
CAPSULE, DELAYED RELEASE ORAL
Qty: 90 CAPSULE | Refills: 2 | Status: SHIPPED | OUTPATIENT
Start: 2025-08-28